# Patient Record
Sex: FEMALE | URBAN - METROPOLITAN AREA
[De-identification: names, ages, dates, MRNs, and addresses within clinical notes are randomized per-mention and may not be internally consistent; named-entity substitution may affect disease eponyms.]

---

## 2019-09-17 ENCOUNTER — OFFICE (OUTPATIENT)
Dept: URBAN - METROPOLITAN AREA CLINIC 6 | Facility: CLINIC | Age: 69
End: 2019-09-17

## 2019-09-17 VITALS
SYSTOLIC BLOOD PRESSURE: 153 MMHG | WEIGHT: 106 LBS | DIASTOLIC BLOOD PRESSURE: 82 MMHG | HEART RATE: 87 BPM | TEMPERATURE: 98.5 F | HEIGHT: 63 IN

## 2019-09-17 DIAGNOSIS — Z12.11 SCREENING FOR COLONIC NEOPLASIA: ICD-10-CM

## 2019-09-17 DIAGNOSIS — K59.01 CONSTIPATION, SLOW TRANSIT: ICD-10-CM

## 2019-09-17 DIAGNOSIS — Z80.0 FAMILY HISTORY OF MALIGNANT NEOPLASM OF COLON: ICD-10-CM

## 2019-09-17 PROCEDURE — 99245 OFF/OP CONSLTJ NEW/EST HI 55: CPT | Performed by: SPECIALIST

## 2019-09-17 NOTE — SERVICEHPINOTES
The patient is seen for evaluation of constipation.    Notes the onset of constipation   several  months   ago.  Symptoms started   gradually  .   Currently, the patient evacuates stool   3   times per   week  .    The stools are   hard  .   They are typically   brown   in color.    Associated symptoms include   mild lower abdominal discomfort and bloating  .  Symptoms are alleviated with   Fiber supplement  .  The patient has previously medicated symptoms with   fiber supplement  .   The patient has undergone a colonoscopy    ago.  Findings on that exam included   .    Patient reports family history of colon cancer. Her mother had colon cancer in her 60s. Her last colonoscopy was more than 20 years ago.

## 2019-10-03 ENCOUNTER — AMBULATORY SURGICAL CENTER (OUTPATIENT)
Dept: URBAN - METROPOLITAN AREA SURGERY 5 | Facility: SURGERY | Age: 69
End: 2019-10-03

## 2019-10-03 VITALS
RESPIRATION RATE: 15 BRPM | DIASTOLIC BLOOD PRESSURE: 69 MMHG | WEIGHT: 100 LBS | OXYGEN SATURATION: 100 % | TEMPERATURE: 98.1 F | HEART RATE: 81 BPM | HEIGHT: 63 IN | SYSTOLIC BLOOD PRESSURE: 144 MMHG

## 2019-10-03 DIAGNOSIS — D12.2 BENIGN NEOPLASM OF ASCENDING COLON: ICD-10-CM

## 2019-10-03 DIAGNOSIS — Z12.11 ENCOUNTER FOR SCREENING FOR MALIGNANT NEOPLASM OF COLON: ICD-10-CM

## 2019-10-03 PROBLEM — D12.3 BENIGN NEOPLASM OF TRANSVERSE COLON: Status: ACTIVE | Noted: 2019-10-03

## 2019-10-03 PROBLEM — K64.0 FIRST DEGREE HEMORRHOIDS: Status: ACTIVE | Noted: 2019-10-03

## 2019-10-03 PROCEDURE — 45385 COLONOSCOPY W/LESION REMOVAL: CPT | Performed by: SPECIALIST

## 2019-10-03 PROCEDURE — 45380 COLONOSCOPY AND BIOPSY: CPT | Mod: 59 | Performed by: SPECIALIST

## 2019-10-04 LAB — SURGICAL: PDF REPORT: (no result)

## 2023-09-02 ENCOUNTER — APPOINTMENT (OUTPATIENT)
Dept: CARDIOLOGY | Facility: MEDICAL CENTER | Age: 73
DRG: 246 | End: 2023-09-02
Attending: INTERNAL MEDICINE
Payer: MEDICARE

## 2023-09-02 ENCOUNTER — HOSPITAL ENCOUNTER (INPATIENT)
Facility: MEDICAL CENTER | Age: 73
LOS: 4 days | DRG: 246 | End: 2023-09-06
Attending: EMERGENCY MEDICINE | Admitting: INTERNAL MEDICINE
Payer: MEDICARE

## 2023-09-02 ENCOUNTER — APPOINTMENT (OUTPATIENT)
Dept: RADIOLOGY | Facility: MEDICAL CENTER | Age: 73
DRG: 246 | End: 2023-09-02
Payer: MEDICARE

## 2023-09-02 DIAGNOSIS — I21.02 ST ELEVATION MYOCARDIAL INFARCTION INVOLVING LEFT ANTERIOR DESCENDING (LAD) CORONARY ARTERY (HCC): ICD-10-CM

## 2023-09-02 DIAGNOSIS — I47.10 PSVT (PAROXYSMAL SUPRAVENTRICULAR TACHYCARDIA) (HCC): ICD-10-CM

## 2023-09-02 DIAGNOSIS — Z92.89 HISTORY OF THROMBOLYTIC THERAPY: ICD-10-CM

## 2023-09-02 DIAGNOSIS — I21.3 ST ELEVATION MYOCARDIAL INFARCTION (STEMI), UNSPECIFIED ARTERY (HCC): ICD-10-CM

## 2023-09-02 DIAGNOSIS — I25.5 ISCHEMIC CARDIOMYOPATHY: ICD-10-CM

## 2023-09-02 PROBLEM — E03.8 HYPOTHYROIDISM DUE TO HASHIMOTO'S THYROIDITIS: Status: ACTIVE | Noted: 2023-09-02

## 2023-09-02 PROBLEM — E78.5 HYPERLIPIDEMIA: Status: ACTIVE | Noted: 2023-09-02

## 2023-09-02 PROBLEM — E06.3 HYPOTHYROIDISM DUE TO HASHIMOTO'S THYROIDITIS: Status: ACTIVE | Noted: 2023-09-02

## 2023-09-02 LAB
ACT BLD: 317 SEC (ref 74–137)
ACT BLD: 329 SEC (ref 74–137)
ALBUMIN SERPL BCP-MCNC: 4.8 G/DL (ref 3.2–4.9)
ALBUMIN/GLOB SERPL: 1.7 G/DL
ALP SERPL-CCNC: 74 U/L (ref 30–99)
ALT SERPL-CCNC: 17 U/L (ref 2–50)
ANION GAP SERPL CALC-SCNC: 13 MMOL/L (ref 7–16)
APTT PPP: 84 SEC (ref 24.7–36)
AST SERPL-CCNC: 42 U/L (ref 12–45)
BASOPHILS # BLD AUTO: 0.2 % (ref 0–1.8)
BASOPHILS # BLD: 0.03 K/UL (ref 0–0.12)
BILIRUB SERPL-MCNC: 0.2 MG/DL (ref 0.1–1.5)
BUN SERPL-MCNC: 20 MG/DL (ref 8–22)
CALCIUM ALBUM COR SERPL-MCNC: 8.9 MG/DL (ref 8.5–10.5)
CALCIUM SERPL-MCNC: 9.5 MG/DL (ref 8.5–10.5)
CHLORIDE SERPL-SCNC: 102 MMOL/L (ref 96–112)
CO2 SERPL-SCNC: 25 MMOL/L (ref 20–33)
CREAT SERPL-MCNC: 0.8 MG/DL (ref 0.5–1.4)
EKG IMPRESSION: NORMAL
EOSINOPHIL # BLD AUTO: 0 K/UL (ref 0–0.51)
EOSINOPHIL NFR BLD: 0 % (ref 0–6.9)
ERYTHROCYTE [DISTWIDTH] IN BLOOD BY AUTOMATED COUNT: 42.8 FL (ref 35.9–50)
GFR SERPLBLD CREATININE-BSD FMLA CKD-EPI: 77 ML/MIN/1.73 M 2
GLOBULIN SER CALC-MCNC: 2.8 G/DL (ref 1.9–3.5)
GLUCOSE SERPL-MCNC: 189 MG/DL (ref 65–99)
HCT VFR BLD AUTO: 45.6 % (ref 37–47)
HGB BLD-MCNC: 15.4 G/DL (ref 12–16)
IMM GRANULOCYTES # BLD AUTO: 0.07 K/UL (ref 0–0.11)
IMM GRANULOCYTES NFR BLD AUTO: 0.4 % (ref 0–0.9)
INR PPP: 1.04 (ref 0.87–1.13)
LV EJECT FRACT  99904: 30
LV EJECT FRACT MOD 2C 99903: 43.37
LV EJECT FRACT MOD 4C 99902: 40.45
LV EJECT FRACT MOD BP 99901: 41.82
LYMPHOCYTES # BLD AUTO: 0.47 K/UL (ref 1–4.8)
LYMPHOCYTES NFR BLD: 2.6 % (ref 22–41)
MCH RBC QN AUTO: 31.1 PG (ref 27–33)
MCHC RBC AUTO-ENTMCNC: 33.8 G/DL (ref 32.2–35.5)
MCV RBC AUTO: 92.1 FL (ref 81.4–97.8)
MONOCYTES # BLD AUTO: 0.39 K/UL (ref 0–0.85)
MONOCYTES NFR BLD AUTO: 2.1 % (ref 0–13.4)
NEUTROPHILS # BLD AUTO: 17.25 K/UL (ref 1.82–7.42)
NEUTROPHILS NFR BLD: 94.7 % (ref 44–72)
NRBC # BLD AUTO: 0 K/UL
NRBC BLD-RTO: 0 /100 WBC (ref 0–0.2)
NT-PROBNP SERPL IA-MCNC: 2666 PG/ML (ref 0–125)
PLATELET # BLD AUTO: 312 K/UL (ref 164–446)
PMV BLD AUTO: 10.7 FL (ref 9–12.9)
POTASSIUM SERPL-SCNC: 4 MMOL/L (ref 3.6–5.5)
PROT SERPL-MCNC: 7.6 G/DL (ref 6–8.2)
PROTHROMBIN TIME: 13.8 SEC (ref 12–14.6)
RBC # BLD AUTO: 4.95 M/UL (ref 4.2–5.4)
SODIUM SERPL-SCNC: 140 MMOL/L (ref 135–145)
T4 FREE SERPL-MCNC: 1.49 NG/DL (ref 0.93–1.7)
TROPONIN T SERPL-MCNC: 1135 NG/L (ref 6–19)
TSH SERPL DL<=0.005 MIU/L-ACNC: 0.96 UIU/ML (ref 0.38–5.33)
TSH SERPL DL<=0.005 MIU/L-ACNC: 0.97 UIU/ML (ref 0.38–5.33)
UFH PPP CHRO-ACNC: 0.76 IU/ML
WBC # BLD AUTO: 18.2 K/UL (ref 4.8–10.8)

## 2023-09-02 PROCEDURE — 36415 COLL VENOUS BLD VENIPUNCTURE: CPT

## 2023-09-02 PROCEDURE — 700111 HCHG RX REV CODE 636 W/ 250 OVERRIDE (IP)

## 2023-09-02 PROCEDURE — 700102 HCHG RX REV CODE 250 W/ 637 OVERRIDE(OP): Performed by: INTERNAL MEDICINE

## 2023-09-02 PROCEDURE — 84443 ASSAY THYROID STIM HORMONE: CPT

## 2023-09-02 PROCEDURE — 770022 HCHG ROOM/CARE - ICU (200)

## 2023-09-02 PROCEDURE — 93010 ELECTROCARDIOGRAM REPORT: CPT | Mod: 59 | Performed by: INTERNAL MEDICINE

## 2023-09-02 PROCEDURE — 3E05317 INTRODUCTION OF OTHER THROMBOLYTIC INTO PERIPHERAL ARTERY, PERCUTANEOUS APPROACH: ICD-10-PCS | Performed by: EMERGENCY MEDICINE

## 2023-09-02 PROCEDURE — 71045 X-RAY EXAM CHEST 1 VIEW: CPT

## 2023-09-02 PROCEDURE — 99153 MOD SED SAME PHYS/QHP EA: CPT

## 2023-09-02 PROCEDURE — B221Z2Z COMPUTERIZED TOMOGRAPHY (CT SCAN) OF MULTIPLE CORONARY ARTERIES USING INTRAVASCULAR OPTICAL COHERENCE: ICD-10-PCS | Performed by: INTERNAL MEDICINE

## 2023-09-02 PROCEDURE — 99291 CRITICAL CARE FIRST HOUR: CPT

## 2023-09-02 PROCEDURE — 700102 HCHG RX REV CODE 250 W/ 637 OVERRIDE(OP): Performed by: EMERGENCY MEDICINE

## 2023-09-02 PROCEDURE — 85730 THROMBOPLASTIN TIME PARTIAL: CPT

## 2023-09-02 PROCEDURE — 85520 HEPARIN ASSAY: CPT

## 2023-09-02 PROCEDURE — 85025 COMPLETE CBC W/AUTO DIFF WBC: CPT

## 2023-09-02 PROCEDURE — 93005 ELECTROCARDIOGRAM TRACING: CPT | Performed by: INTERNAL MEDICINE

## 2023-09-02 PROCEDURE — 93306 TTE W/DOPPLER COMPLETE: CPT | Mod: 26 | Performed by: INTERNAL MEDICINE

## 2023-09-02 PROCEDURE — 92978 ENDOLUMINL IVUS OCT C 1ST: CPT | Mod: 26,LD | Performed by: INTERNAL MEDICINE

## 2023-09-02 PROCEDURE — B2111ZZ FLUOROSCOPY OF MULTIPLE CORONARY ARTERIES USING LOW OSMOLAR CONTRAST: ICD-10-PCS | Performed by: INTERNAL MEDICINE

## 2023-09-02 PROCEDURE — 93005 ELECTROCARDIOGRAM TRACING: CPT | Performed by: EMERGENCY MEDICINE

## 2023-09-02 PROCEDURE — 96365 THER/PROPH/DIAG IV INF INIT: CPT

## 2023-09-02 PROCEDURE — A9270 NON-COVERED ITEM OR SERVICE: HCPCS | Performed by: INTERNAL MEDICINE

## 2023-09-02 PROCEDURE — 96368 THER/DIAG CONCURRENT INF: CPT

## 2023-09-02 PROCEDURE — 84439 ASSAY OF FREE THYROXINE: CPT

## 2023-09-02 PROCEDURE — 85610 PROTHROMBIN TIME: CPT

## 2023-09-02 PROCEDURE — 85347 COAGULATION TIME ACTIVATED: CPT | Mod: 91

## 2023-09-02 PROCEDURE — 700101 HCHG RX REV CODE 250

## 2023-09-02 PROCEDURE — 80053 COMPREHEN METABOLIC PANEL: CPT

## 2023-09-02 PROCEDURE — 99291 CRITICAL CARE FIRST HOUR: CPT | Performed by: INTERNAL MEDICINE

## 2023-09-02 PROCEDURE — 99152 MOD SED SAME PHYS/QHP 5/>YRS: CPT | Performed by: INTERNAL MEDICINE

## 2023-09-02 PROCEDURE — 96375 TX/PRO/DX INJ NEW DRUG ADDON: CPT

## 2023-09-02 PROCEDURE — 700117 HCHG RX CONTRAST REV CODE 255: Performed by: INTERNAL MEDICINE

## 2023-09-02 PROCEDURE — 93458 L HRT ARTERY/VENTRICLE ANGIO: CPT | Mod: 26,59 | Performed by: INTERNAL MEDICINE

## 2023-09-02 PROCEDURE — 027035Z DILATION OF CORONARY ARTERY, ONE ARTERY WITH TWO DRUG-ELUTING INTRALUMINAL DEVICES, PERCUTANEOUS APPROACH: ICD-10-PCS | Performed by: INTERNAL MEDICINE

## 2023-09-02 PROCEDURE — 4A023N7 MEASUREMENT OF CARDIAC SAMPLING AND PRESSURE, LEFT HEART, PERCUTANEOUS APPROACH: ICD-10-PCS | Performed by: INTERNAL MEDICINE

## 2023-09-02 PROCEDURE — 700111 HCHG RX REV CODE 636 W/ 250 OVERRIDE (IP): Mod: JZ | Performed by: EMERGENCY MEDICINE

## 2023-09-02 PROCEDURE — 84484 ASSAY OF TROPONIN QUANT: CPT

## 2023-09-02 PROCEDURE — B2151ZZ FLUOROSCOPY OF LEFT HEART USING LOW OSMOLAR CONTRAST: ICD-10-PCS | Performed by: INTERNAL MEDICINE

## 2023-09-02 PROCEDURE — 99223 1ST HOSP IP/OBS HIGH 75: CPT | Performed by: INTERNAL MEDICINE

## 2023-09-02 PROCEDURE — 93306 TTE W/DOPPLER COMPLETE: CPT

## 2023-09-02 PROCEDURE — 92941 PRQ TRLML REVSC TOT OCCL AMI: CPT | Mod: LD | Performed by: INTERNAL MEDICINE

## 2023-09-02 PROCEDURE — 700105 HCHG RX REV CODE 258: Performed by: INTERNAL MEDICINE

## 2023-09-02 PROCEDURE — 83880 ASSAY OF NATRIURETIC PEPTIDE: CPT

## 2023-09-02 PROCEDURE — A9270 NON-COVERED ITEM OR SERVICE: HCPCS | Performed by: EMERGENCY MEDICINE

## 2023-09-02 PROCEDURE — 96367 TX/PROPH/DG ADDL SEQ IV INF: CPT

## 2023-09-02 RX ORDER — MIDAZOLAM HYDROCHLORIDE 1 MG/ML
INJECTION INTRAMUSCULAR; INTRAVENOUS
Status: COMPLETED
Start: 2023-09-02 | End: 2023-09-02

## 2023-09-02 RX ORDER — ASPIRIN 300 MG/1
300 SUPPOSITORY RECTAL DAILY
Status: DISCONTINUED | OUTPATIENT
Start: 2023-09-03 | End: 2023-09-02

## 2023-09-02 RX ORDER — CLOPIDOGREL 300 MG/1
300 TABLET, FILM COATED ORAL ONCE
Status: COMPLETED | OUTPATIENT
Start: 2023-09-02 | End: 2023-09-02

## 2023-09-02 RX ORDER — ATORVASTATIN CALCIUM 80 MG/1
80 TABLET, FILM COATED ORAL EVERY EVENING
Status: DISCONTINUED | OUTPATIENT
Start: 2023-09-02 | End: 2023-09-06 | Stop reason: HOSPADM

## 2023-09-02 RX ORDER — AMOXICILLIN 250 MG
2 CAPSULE ORAL 2 TIMES DAILY
Status: DISCONTINUED | OUTPATIENT
Start: 2023-09-02 | End: 2023-09-06 | Stop reason: HOSPADM

## 2023-09-02 RX ORDER — SODIUM CHLORIDE 9 MG/ML
3 INJECTION, SOLUTION INTRAVENOUS CONTINUOUS
Status: ACTIVE | OUTPATIENT
Start: 2023-09-02 | End: 2023-09-02

## 2023-09-02 RX ORDER — HEPARIN SODIUM 5000 [USP'U]/100ML
INJECTION, SOLUTION INTRAVENOUS
Status: COMPLETED
Start: 2023-09-02 | End: 2023-09-02

## 2023-09-02 RX ORDER — ASPIRIN 81 MG/1
324 TABLET, CHEWABLE ORAL DAILY
Status: DISCONTINUED | OUTPATIENT
Start: 2023-09-03 | End: 2023-09-02

## 2023-09-02 RX ORDER — LEVOTHYROXINE SODIUM 0.07 MG/1
75 TABLET ORAL
COMMUNITY

## 2023-09-02 RX ORDER — METOPROLOL SUCCINATE 50 MG/1
50 TABLET, EXTENDED RELEASE ORAL EVERY EVENING
Status: ON HOLD | COMMUNITY
End: 2023-09-05

## 2023-09-02 RX ORDER — LISINOPRIL 5 MG/1
5 TABLET ORAL TWICE DAILY
Status: DISCONTINUED | OUTPATIENT
Start: 2023-09-02 | End: 2023-09-03

## 2023-09-02 RX ORDER — CLOPIDOGREL 300 MG/1
TABLET, FILM COATED ORAL
Status: COMPLETED
Start: 2023-09-02 | End: 2023-09-02

## 2023-09-02 RX ORDER — HEPARIN SODIUM 1000 [USP'U]/ML
30 INJECTION, SOLUTION INTRAVENOUS; SUBCUTANEOUS PRN
Status: DISCONTINUED | OUTPATIENT
Start: 2023-09-02 | End: 2023-09-03

## 2023-09-02 RX ORDER — VERAPAMIL HYDROCHLORIDE 2.5 MG/ML
INJECTION, SOLUTION INTRAVENOUS
Status: COMPLETED
Start: 2023-09-02 | End: 2023-09-02

## 2023-09-02 RX ORDER — HEPARIN SODIUM 200 [USP'U]/100ML
INJECTION, SOLUTION INTRAVENOUS
Status: COMPLETED
Start: 2023-09-02 | End: 2023-09-02

## 2023-09-02 RX ORDER — ASPIRIN 81 MG/1
81 TABLET ORAL DAILY
Status: DISCONTINUED | OUTPATIENT
Start: 2023-09-02 | End: 2023-09-06 | Stop reason: HOSPADM

## 2023-09-02 RX ORDER — BISACODYL 10 MG
10 SUPPOSITORY, RECTAL RECTAL
Status: DISCONTINUED | OUTPATIENT
Start: 2023-09-02 | End: 2023-09-06 | Stop reason: HOSPADM

## 2023-09-02 RX ORDER — LIDOCAINE HYDROCHLORIDE 20 MG/ML
INJECTION, SOLUTION INFILTRATION; PERINEURAL
Status: COMPLETED
Start: 2023-09-02 | End: 2023-09-02

## 2023-09-02 RX ORDER — HEPARIN SODIUM 1000 [USP'U]/ML
30 INJECTION, SOLUTION INTRAVENOUS; SUBCUTANEOUS PRN
Status: DISCONTINUED | OUTPATIENT
Start: 2023-09-02 | End: 2023-09-02

## 2023-09-02 RX ORDER — ASPIRIN 325 MG
325 TABLET ORAL DAILY
Status: DISCONTINUED | OUTPATIENT
Start: 2023-09-03 | End: 2023-09-02

## 2023-09-02 RX ORDER — HEPARIN SODIUM 5000 [USP'U]/100ML
0-30 INJECTION, SOLUTION INTRAVENOUS CONTINUOUS
Status: DISCONTINUED | OUTPATIENT
Start: 2023-09-02 | End: 2023-09-02

## 2023-09-02 RX ORDER — POLYETHYLENE GLYCOL 3350 17 G/17G
1 POWDER, FOR SOLUTION ORAL
Status: DISCONTINUED | OUTPATIENT
Start: 2023-09-02 | End: 2023-09-06 | Stop reason: HOSPADM

## 2023-09-02 RX ORDER — LISINOPRIL 5 MG/1
5 TABLET ORAL
Status: DISCONTINUED | OUTPATIENT
Start: 2023-09-03 | End: 2023-09-02

## 2023-09-02 RX ORDER — HEPARIN SODIUM 1000 [USP'U]/ML
INJECTION, SOLUTION INTRAVENOUS; SUBCUTANEOUS
Status: COMPLETED
Start: 2023-09-02 | End: 2023-09-02

## 2023-09-02 RX ORDER — NITROGLYCERIN 20 MG/100ML
0-200 INJECTION INTRAVENOUS CONTINUOUS
Status: DISCONTINUED | OUTPATIENT
Start: 2023-09-02 | End: 2023-09-02

## 2023-09-02 RX ORDER — ALENDRONATE SODIUM 70 MG/1
70 TABLET ORAL
COMMUNITY

## 2023-09-02 RX ORDER — CLOPIDOGREL 300 MG/1
300 TABLET, FILM COATED ORAL ONCE
Status: DISCONTINUED | OUTPATIENT
Start: 2023-09-02 | End: 2023-09-02

## 2023-09-02 RX ORDER — SPIRONOLACTONE 25 MG/1
12.5 TABLET ORAL
Status: DISCONTINUED | OUTPATIENT
Start: 2023-09-03 | End: 2023-09-03

## 2023-09-02 RX ORDER — NITROGLYCERIN 20 MG/100ML
0-200 INJECTION INTRAVENOUS CONTINUOUS
Status: DISCONTINUED | OUTPATIENT
Start: 2023-09-02 | End: 2023-09-03

## 2023-09-02 RX ORDER — MORPHINE SULFATE 4 MG/ML
2-4 INJECTION INTRAVENOUS
Status: DISCONTINUED | OUTPATIENT
Start: 2023-09-02 | End: 2023-09-06 | Stop reason: HOSPADM

## 2023-09-02 RX ORDER — CLOPIDOGREL BISULFATE 75 MG/1
75 TABLET ORAL DAILY
Status: DISCONTINUED | OUTPATIENT
Start: 2023-09-03 | End: 2023-09-06 | Stop reason: HOSPADM

## 2023-09-02 RX ORDER — HEPARIN SODIUM 5000 [USP'U]/100ML
0-30 INJECTION, SOLUTION INTRAVENOUS CONTINUOUS
Status: DISCONTINUED | OUTPATIENT
Start: 2023-09-02 | End: 2023-09-03

## 2023-09-02 RX ADMIN — SENNOSIDES AND DOCUSATE SODIUM 2 TABLET: 50; 8.6 TABLET ORAL at 17:08

## 2023-09-02 RX ADMIN — FENTANYL CITRATE 50 MCG: 50 INJECTION, SOLUTION INTRAMUSCULAR; INTRAVENOUS at 09:25

## 2023-09-02 RX ADMIN — SODIUM CHLORIDE 3 ML/KG/HR: 9 INJECTION, SOLUTION INTRAVENOUS at 10:16

## 2023-09-02 RX ADMIN — HEPARIN SODIUM 12 UNITS/KG/HR: 5000 INJECTION, SOLUTION INTRAVENOUS at 06:31

## 2023-09-02 RX ADMIN — ATORVASTATIN CALCIUM 80 MG: 80 TABLET, FILM COATED ORAL at 17:08

## 2023-09-02 RX ADMIN — METOPROLOL TARTRATE 25 MG: 25 TABLET, FILM COATED ORAL at 21:03

## 2023-09-02 RX ADMIN — HEPARIN SODIUM: 1000 INJECTION, SOLUTION INTRAVENOUS; SUBCUTANEOUS at 08:35

## 2023-09-02 RX ADMIN — LIDOCAINE HYDROCHLORIDE: 20 INJECTION, SOLUTION INFILTRATION; PERINEURAL at 08:35

## 2023-09-02 RX ADMIN — MIDAZOLAM 2 MG: 1 INJECTION, SOLUTION INTRAMUSCULAR; INTRAVENOUS at 08:45

## 2023-09-02 RX ADMIN — VERAPAMIL HYDROCHLORIDE 2.5 MG: 2.5 INJECTION, SOLUTION INTRAVENOUS at 08:35

## 2023-09-02 RX ADMIN — NITROGLYCERIN 10 ML: 20 INJECTION INTRAVENOUS at 08:36

## 2023-09-02 RX ADMIN — FENTANYL CITRATE 100 MCG: 50 INJECTION, SOLUTION INTRAMUSCULAR; INTRAVENOUS at 08:45

## 2023-09-02 RX ADMIN — HEPARIN SODIUM 2000 UNITS: 200 INJECTION, SOLUTION INTRAVENOUS at 08:35

## 2023-09-02 RX ADMIN — NITROGLYCERIN 20 MCG/MIN: 20 INJECTION INTRAVENOUS at 06:44

## 2023-09-02 RX ADMIN — MIDAZOLAM 1.5 MG: 1 INJECTION, SOLUTION INTRAMUSCULAR; INTRAVENOUS at 09:25

## 2023-09-02 RX ADMIN — CLOPIDOGREL BISULFATE 300 MG: 300 TABLET, FILM COATED ORAL at 06:39

## 2023-09-02 RX ADMIN — LISINOPRIL 5 MG: 5 TABLET ORAL at 14:08

## 2023-09-02 RX ADMIN — METOPROLOL TARTRATE 25 MG: 25 TABLET, FILM COATED ORAL at 14:08

## 2023-09-02 RX ADMIN — HUMAN ALBUMIN MICROSPHERES AND PERFLUTREN 3 ML: 10; .22 INJECTION, SOLUTION INTRAVENOUS at 08:24

## 2023-09-02 ASSESSMENT — ENCOUNTER SYMPTOMS
VOMITING: 0
EYE REDNESS: 0
BRUISES/BLEEDS EASILY: 0
COUGH: 0
WEIGHT LOSS: 0
FEVER: 0
WHEEZING: 0
NAUSEA: 0
HEADACHES: 0
MYALGIAS: 0
MEMORY LOSS: 0
EYE DISCHARGE: 0
PALPITATIONS: 1
CHILLS: 0
DIZZINESS: 0
SHORTNESS OF BREATH: 0
SORE THROAT: 0

## 2023-09-02 ASSESSMENT — PATIENT HEALTH QUESTIONNAIRE - PHQ9
1. LITTLE INTEREST OR PLEASURE IN DOING THINGS: NOT AT ALL
2. FEELING DOWN, DEPRESSED, IRRITABLE, OR HOPELESS: NOT AT ALL
SUM OF ALL RESPONSES TO PHQ9 QUESTIONS 1 AND 2: 0

## 2023-09-02 ASSESSMENT — PAIN DESCRIPTION - PAIN TYPE: TYPE: ACUTE PAIN

## 2023-09-02 NOTE — CONSULTS
Reason for Consult:  Asked by Dr Shan Martinez M.D. to see this patient with stemi    CC: chest pressure, n/v    HPI:      72 year old woman with PMH HTN, hypothyroidism presents with stemi. Initially presented to outside hospital following symptoms of chest tightness/pressure associated with nausea and vomiting. Initially 4/10 she recalls. No other cardiac complaints. She initially suspected indigestion. Was treated with thrombolysis 0400 for her stemi along with aspirin, heparin gtt. She initially vomitted the aspirin and was redosed. Denies toxic social habits. No relevant family history. At [facility] improved symptoms to 2/10 chest tightness. Nausea much improved. Repeat ekg with improved st elevations.     Medications / Drug list prior to admission:  No current facility-administered medications on file prior to encounter.     No current outpatient medications on file prior to encounter.       Current list of administered Medications:    Current Facility-Administered Medications:     HEPARIN (PORCINE) IN NACL 36137-4.45 UT/500ML-% IV SOLN, , , ,     clopidogrel (Plavix) tablet 300 mg, 300 mg, Oral, Once, Shan Martinez M.D.    nitroglycerin 50 mg in D5W 250 ml infusion, 0-200 mcg/min, Intravenous, Continuous, Shan Martinez M.D.    heparin infusion 25,000 units in 500 mL 0.45% NACL, 0-30 Units/kg/hr, Intravenous, Continuous, Shan Martinez M.D.    heparin injection 1,600 Units, 30 Units/kg, Intravenous, PRN, Shan Martinez M.D.  No current outpatient medications on file.    No past medical history on file.    No past surgical history on file.    No family history on file.  Patient family history was personally reviewed, no pertinent family history to current presentation    Social History     Socioeconomic History    Marital status: Not on file     Spouse name: Not on file    Number of children: Not on file    Years of education: Not on file    Highest education level: Not on file   Occupational History    Not on file  "  Tobacco Use    Smoking status: Not on file    Smokeless tobacco: Not on file   Substance and Sexual Activity    Alcohol use: Not on file    Drug use: Not on file    Sexual activity: Not on file   Other Topics Concern    Not on file   Social History Narrative    Not on file     Social Determinants of Health     Financial Resource Strain: Not on file   Food Insecurity: Not on file   Transportation Needs: Not on file   Physical Activity: Not on file   Stress: Not on file   Social Connections: Not on file   Intimate Partner Violence: Not on file   Housing Stability: Not on file       ALLERGIES:  Not on File    Review of systems:  A complete review of symptoms was reviewed with patient. This is reviewed in H&P and PMH. ALL OTHERS reviewed and negative    Physical exam:  Patient Vitals for the past 24 hrs:   BP Pulse Resp SpO2 Height Weight   23 0615 (!) 142/81 (!) 108 18 96 % -- --   23 0608 -- -- -- -- 1.6 m (5' 2.99\") 52 kg (114 lb 10.2 oz)     General: No acute distress.   EYES: no jaundice  HEENT: OP clear   Neck: No bruits No JVD.   CVS:  RRR. S1 + S2. No M/R/G. No edema.  Resp: CTAB. No wheezing or crackles/rhonchi.  Abdomen: Soft, NT, ND,  Skin: Grossly nothing acute no obvious rashes  Neurological: Alert, Moves all extremities, no cranial nerve defects on limited exam  Extremities: Pulse 2+ in b/l LE. No cyanosis.     Data:  Laboratory studies personally reviewed by me:  Recent Results (from the past 24 hour(s))   EKG - STAT    Collection Time: 23  6:01 AM   Result Value Ref Range    Report       [facility] Emergency Dept.    Test Date:  2023  Pt Name:    KIRK AN                 Department: ER  MRN:        [id number]                      Room:       TRAUMA - EXAM 1  Gender:     Female                       Technician: [id number]  :        1950                   Requested By:CRYSTAL HUGHES  Order #:    [id number]                    Reading " MD:    Measurements  Intervals                                Axis  Rate:       111                          P:          90  FL:         133                          QRS:        16  QRSD:       76                           T:          72  QT:         337  QTc:        458    Interpretive Statements  Sinus tachycardia  Atrial premature complexes  Anterolateral infarct, acute (LAD)  No previous ECG available for comparison         EKG  9/2/23 interpreted by me sinus tach 111, anterolateral infarct recent, inferior also involved    All pertinent features of laboratory and imaging reviewed including primary images where applicable      Active Problems:    * No active hospital problems. *  Resolved Problems:    * No resolved hospital problems. *      Assessment / Plan:  72 year old woman with PMH HTN, hypothyroidism presents with stemi s/p thrombolysis.    -continue aspirin and heparin gtt  -load plavix 300mg  -high intensity statin  -nitro gtt  -LHC >2h post lysis. Will plan for this morning  -TTE    I personally discussed her case with Dr Martinez    It is my pleasure to participate in the care of Ms. Duarte.  Please do not hesitate to contact me with questions or concerns.    Candelario Shipman MD  Cardiologist [facility]    A total of 65 minutes of time was spent on day of encounter reviewing medical record, performing history and examination, counseling, ordering medication/test/consults and documentation.

## 2023-09-02 NOTE — ED NOTES
Bedside report from JIN Streeter. Pt resting in gurney, NAD. Call light within reach. Labs sent.   Detail Level: Detailed Add 98281 Cpt? (Important Note: In 2017 The Use Of 11180 Is Being Tracked By Cms To Determine Future Global Period Reimbursement For Global Periods): yes

## 2023-09-02 NOTE — DISCHARGE PLANNING
STEMI Response    Referral: Code STEMI Response    Intervention: MSW responded to STEMI.  Pt was BIB [facility] after STEMI.  Pt was alert upon arrival.  Pts name is Kristie Duarte (: 1950).  SW obtained the following pt information: SW spoke to the pt and she has her phone and advised SW that she will call her . The pt stated her  will be on his way to [facility] this morning.     Dennis Duarte () [phone]    Plan: SW will remain available for pt support.

## 2023-09-02 NOTE — ED PROVIDER NOTES
ER Provider Note    Scribed for Shan Martinez M.d. by Daniel Winters. 9/2/2023  6:12 AM    Primary Care Provider: No primary care provider on file.    CHIEF COMPLAINT  No chief complaint on file.      HPI/ROS  LIMITATION TO HISTORY   Select: : None  OUTSIDE HISTORIAN(S):  EMS Present at bedside    Kristie Duarte is a 72 y.o. female who presents to the ED for evaluation of a STEMI onset 23:00 9/1/2023. EMS reports the the patient complained of chest pressure and pain, describing it as 3/10 in intensity. EMS states the patient was seen in the [facility] where she had an EKG performed where ST segment elevation was observed and diagnosed with an inferior myocardial infarction . EMS states her troponin was elevated at that time. The patient was given TNK 30 mg at 0405, Heparin bolus 4000 unit, Heparin drip 12 unit/kg/hour, . Nitroglycerin 0.4, Tylenol 1g, and Zofran 4 mg injection. EMS states after TNK and nitroglycerin were administered, the patient was improved after medication. The patient states she has no history of MI, but does have a history of hypothyroidism Hashimoto's Disease, hypertension, and cholecystectomy. She denies any shortness of breath. chest pressure before symptom onset last night, but reports noticing tachycardia and believes altitude contributed.       PAST MEDICAL HISTORY  No past medical history on file.    SURGICAL HISTORY  No past surgical history on file.    FAMILY HISTORY  No family history on file.    SOCIAL HISTORY       CURRENT MEDICATIONS  Previous Medications    No medications on file       ALLERGIES  Patient has no allergy information on record.    PHYSICAL EXAM  Wt 52 kg (114 lb 10.2 oz)     Constitutional: Well developed, Well nourished, No acute distress, Non-toxic appearance.   HENT: Normocephalic, Atraumatic, Bilateral external ears normal, Oropharynx is clear mucous membranes are moist. No oral exudates or nasal discharge.   Eyes: Pupils are equal round and reactive, EOMI,  Conjunctiva normal, No discharge.   Neck: Normal range of motion, No tenderness, Supple, No stridor. No meningismus.  Lymphatic: No lymphadenopathy noted.   Cardiovascular: Tachycardia without murmur rub or gallop.  Thorax & Lungs: Clear breath sounds bilaterally without wheezes, rhonchi or rales. There is no chest wall tenderness.   Abdomen: Soft non-tender non-distended. There is no rebound or guarding. No organomegaly is appreciated. Bowel sounds are normal.  Skin: Normal without rash.   Back: No CVA or spinal tenderness.   Extremities: Intact distal pulses, No edema, No tenderness, No cyanosis, No clubbing. Capillary refill is less than 2 seconds.  Musculoskeletal: Good range of motion in all major joints. No tenderness to palpation or major deformities noted.   Neurologic: Alert & oriented x 3, Normal motor function, Normal sensory function, No focal deficits noted. Reflexes are normal.  Psychiatric: Affect normal, Judgment normal, Mood normal. There is no suicidal ideation or patient reported hallucinations.      DIAGNOSTIC STUDIES    Labs:   Labs Reviewed   CBC WITH DIFFERENTIAL - Abnormal; Notable for the following components:       Result Value    WBC 18.2 (*)     Neutrophils-Polys 94.70 (*)     Lymphocytes 2.60 (*)     Neutrophils (Absolute) 17.25 (*)     Lymphs (Absolute) 0.47 (*)     All other components within normal limits    Narrative:     Biotin intake of greater than 5 mg per day may interfere with  troponin levels, causing false low values.  Indicate which anticoagulants the patient is on:->UNKNOWN   COMP METABOLIC PANEL    Narrative:     Biotin intake of greater than 5 mg per day may interfere with  troponin levels, causing false low values.  Indicate which anticoagulants the patient is on:->UNKNOWN   TROPONIN    Narrative:     Biotin intake of greater than 5 mg per day may interfere with  troponin levels, causing false low values.  Indicate which anticoagulants the patient is on:->UNKNOWN   APTT     Narrative:     Biotin intake of greater than 5 mg per day may interfere with  troponin levels, causing false low values.  Indicate which anticoagulants the patient is on:->UNKNOWN   PROTHROMBIN TIME    Narrative:     Biotin intake of greater than 5 mg per day may interfere with  troponin levels, causing false low values.  Indicate which anticoagulants the patient is on:->UNKNOWN   TSH    Narrative:     Biotin intake of greater than 5 mg per day may interfere with  troponin levels, causing false low values.  Indicate which anticoagulants the patient is on:->UNKNOWN   FREE THYROXINE    Narrative:     Biotin intake of greater than 5 mg per day may interfere with  troponin levels, causing false low values.  Indicate which anticoagulants the patient is on:->UNKNOWN   HEPARIN XA (UNFRACTIONATED)    Narrative:     Biotin intake of greater than 5 mg per day may interfere with  troponin levels, causing false low values.  Indicate which anticoagulants the patient is on:->UNKNOWN        EKG:   I have independently interpreted this EKG  Results for orders placed or performed during the hospital encounter of 23   EKG - STAT   Result Value Ref Range    Report       [facility] Emergency Dept.    Test Date:  2023  Pt Name:    KIRK AN                 Department: ER  MRN:        [id number]                      Room:        10  Gender:     Female                       Technician: [id number]  :        1950                   Requested By:CRYSTAL HUGHES  Order #:    [id number]                    Reading MD: CRYSTAL HUGHES MD    Measurements  Intervals                                Axis  Rate:       111                          P:          90  MS:         133                          QRS:        16  QRSD:       76                           T:          72  QT:         337  QTc:        458    Interpretive Statements  Sinus tachycardia  Atrial premature complexes  Anterolateral infarct, acute (LAD)  No  previous ECG available for comparison  Electronically Signed On 09- 06:48:42 PDT by CRYSTAL HUGHES MD           Radiology:   The attending emergency physician has independently interpreted the diagnostic imaging associated with this visit and am waiting the final reading from the radiologist.   Preliminary interpretation is a follows: No airspace disease    Radiologist interpretation:   DX-CHEST-PORTABLE (1 VIEW)   Final Result         1.  No acute cardiopulmonary disease.      CL-LEFT HEART CATHETERIZATION WITH POSSIBLE INTERVENTION    (Results Pending)   EC-ECHOCARDIOGRAM COMPLETE W/O CONT    (Results Pending)   EC-ECHOCARDIOGRAM COMPLETE W/O CONT    (Results Pending)        COURSE & MEDICAL DECISION MAKING     ED Observation Status? No; Patient does not meet criteria for ED Observation.     INITIAL ASSESSMENT, COURSE AND PLAN  Care Narrative:     6:13 AM - Patient seen and examined at bedside. Discussed plan of care, including labs, imaging, and EKG. Patient agrees to the plan of care. The patient will be  medicated with heparin injection 1600 units and Plavix 300 mg. Ordered for labs, imaging, and EKG to evaluate her symptoms.   Discussed use of beta blockers with the patient and cardiology who recommended holding taking them.    Chest x-ray is unremarkable.  Laboratory evaluation shows white blood cell count over 18,000 with 94% PMN shift.  Repeat troponin is pending.  Her troponin at the sending facility was over 2000.  She continues to have some pain at a level of 2 out of 10    6:25 AM  AM - I discussed the patient's case and the above findings with Dr. Henning (Intensivist) who agrees to evaluate the patient for hospitalization.  Plan is for the Cath Lab prior to her going to the ICU    6:42 AM I discussed the patient's case and the above findings with Dr. Henning (Intensivist)     6:43 AM Patient was reevaluated at bedside. The patient's color appears improved and the patient reports feeling improved  after medication. Discussed lab and radiology results with the patient and informed them of my plan for hospitalization.  Heparin drip was continued.  Plavix load is done.  Her pain is improving    ADDITIONAL PROBLEM LIST  Hypothyroidism.  Check free T4 and TSH and adjust accordingly    Hypertension.  Manage in ICU    DISPOSITION AND DISCUSSIONS  I have discussed management of the patient with the following physicians and LUIS FELIPE's:  Dr. Henning (Intensivist)     Discussion of management with other [facility] or appropriate source(s): None     DISPOSITION:  Patient will be hospitalized by Dr. Henning in critical condition.     CRITICAL CARE  The very real possibilty of a deterioration of this patient's condition required the highest level of my preparedness for sudden, emergent intervention.  I provided critical care services, which included medication orders, frequent reevaluations of the patient's condition and response to treatment, ordering and reviewing test results, and discussing the case with various consultants.  The critical care time associated with the care of the patient was 30 minutes. Review chart for interventions. This time is exclusive of any other billable procedures.        FINAL DIANGOSIS  1. ST elevation myocardial infarction (STEMI), unspecified artery (HCC)    2. History of thrombolytic therapy    3.  Critical care time, 30 minutes    The note accurately reflects work and decisions made by me.  Shan Martinez M.D.  9/2/2023  6:51 AM

## 2023-09-02 NOTE — PROGRESS NOTES
"Patient arrived from cath lab, VSS, AOx4, mild intermittent chest discomfort with \"deep breaths\", right radial cath site with TR band clean/dry/soft, pulses intact, post-op VS initiated, EKG pending. Patient received TNK at 0405, per protocol, neuro assessments Q30 minutes until 1200 and then hourly. Patient's family at bedside, updates provided.  "

## 2023-09-02 NOTE — CONSULTS
Pulmonary/Critical Care Consultation    Date of consult: 9/2/2023    Referring Physician  Shan Martinez M.D.    Reason for Consultation  STEMI    History of Presenting Illness  72 y.o. female who presented 9/2/2023 with a history of chest pain and palpitations.  The patient is a 72-year-old female who presented to [facility] ED for evaluation of STEMI around 11 PM yesterday.  The patient has been complaining of tachycardia that started on Thursday with palpitations, she also reports that yesterday started feeling chest pressure that evolved to chest pain, the highest around 3 out of 10 in intensity for what she was seen at the emergency department.  Diagnostic work-up included an EKG that show ST segment elevation in the inferior leads.  EMS reports that the troponins were elevated for what the patient received TNK 30 mg at 4 0 5 AM today, was treated with heparin bolus and a subsequent heparin drip, aspirin 324 mg, nitro, Tylenol and Zofran.  The patient was transferred to our facility for further care the management for was called.  I was called from the emergency department to admit this patient who apparently was told the patient is not going immediately to the Cath Lab and will come to the ICU first.    Code Status  Full Code    Review of Systems  Review of Systems   Constitutional:  Negative for chills, fever and weight loss.   HENT:  Negative for congestion, nosebleeds and sore throat.    Eyes:  Negative for discharge and redness.   Respiratory:  Negative for cough, shortness of breath and wheezing.    Cardiovascular:  Positive for chest pain and palpitations. Negative for leg swelling.   Gastrointestinal:  Negative for nausea and vomiting.   Genitourinary:  Negative for dysuria.   Musculoskeletal:  Negative for myalgias.   Skin:  Negative for rash.   Neurological:  Negative for dizziness and headaches.   Endo/Heme/Allergies:  Does not bruise/bleed easily.   Psychiatric/Behavioral:  Negative for  memory loss.    All other systems reviewed and are negative.      Past Medical History   has no past medical history on file.    Surgical History   has no past surgical history on file.    Family History  family history is not on file.    Social History       Medications  Home Medications    **Home medications have not yet been reviewed for this encounter**       Current Facility-Administered Medications   Medication Dose Route Frequency Provider Last Rate Last Admin    senna-docusate (Pericolace Or Senokot S) 8.6-50 MG per tablet 2 Tablet  2 Tablet Oral BID Alexander Henning M.D.        And    polyethylene glycol/lytes (Miralax) PACKET 1 Packet  1 Packet Oral QDAY PRN Alexander Henning M.D.        And    magnesium hydroxide (Milk Of Magnesia) suspension 30 mL  30 mL Oral QDAY PRN Alexander Henning M.D.        And    bisacodyl (Dulcolax) suppository 10 mg  10 mg Rectal QDAY PRN Alexander Henning M.D.        heparin infusion 25,000 units in 500 mL 0.45% NACL  0-30 Units/kg/hr Intravenous Continuous Alexander Henning M.D.   Stopped. (Insulin or Heparin) at 09/02/23 0810    heparin injection 1,600 Units  30 Units/kg Intravenous PRN Alexander Henning M.D.        atorvastatin (Lipitor) tablet 80 mg  80 mg Oral Q EVENING Alexander Henning M.D.        [START ON 9/3/2023] spironolactone (Aldactone) tablet 12.5 mg  12.5 mg Oral Q DAY Alexander Henning M.D.        morphine 4 MG/ML injection 2-4 mg  2-4 mg Intravenous Q5 MIN PRN Alexander Henning M.D.        nitroglycerin 50 mg in D5W 250 ml infusion  0-200 mcg/min Intravenous Continuous Alexander Henning M.D.   Stopped at 09/02/23 0810    aspirin EC tablet 81 mg  81 mg Oral DAILY Petr Virgen M.D.        NS infusion  3 mL/kg/hr Intravenous Continuous Petr Virgen M.D. 156 mL/hr at 09/02/23 1016 3 mL/kg/hr at 09/02/23 1016    [START ON 9/3/2023] clopidogrel (Plavix) tablet 75 mg  75 mg Oral DAILY Petr Virgen M.D.        lisinopril (Prinivil)  tablet 5 mg  5 mg Oral TWICE DAILY Petr Virgen M.D.        metoprolol tartrate (Lopressor) tablet 25 mg  25 mg Oral Q8HRS Petr Virgen M.D.           Allergies  No Known Allergies    Vital Signs last 24 hours  Temp:  [36.2 °C (97.1 °F)] 36.2 °C (97.1 °F)  Pulse:  [] 93  Resp:  [18-25] 22  BP: (113-156)/(56-81) 126/56  SpO2:  [92 %-96 %] 94 %    Physical Exam  Physical Exam  Vitals and nursing note reviewed.   Constitutional:       General: She is not in acute distress.     Appearance: Normal appearance. She is not toxic-appearing.   HENT:      Head: Normocephalic and atraumatic.      Nose: Nose normal.      Mouth/Throat:      Mouth: Mucous membranes are moist.   Eyes:      Extraocular Movements: Extraocular movements intact.   Cardiovascular:      Rate and Rhythm: Normal rate and regular rhythm.      Pulses: Normal pulses.      Heart sounds: Normal heart sounds. No murmur heard.     No gallop.   Pulmonary:      Effort: Pulmonary effort is normal. No respiratory distress.      Breath sounds: Normal breath sounds. No stridor. No wheezing or rales.   Abdominal:      General: Bowel sounds are normal.   Musculoskeletal:         General: No swelling. Normal range of motion.      Cervical back: Normal range of motion. No rigidity.      Right lower leg: No edema.      Left lower leg: No edema.   Skin:     General: Skin is warm.      Capillary Refill: Capillary refill takes less than 2 seconds.      Findings: No rash.   Neurological:      General: No focal deficit present.      Mental Status: She is alert and oriented to person, place, and time.   Psychiatric:         Mood and Affect: Mood normal.         Fluids    Intake/Output Summary (Last 24 hours) at 9/2/2023 1305  Last data filed at 9/2/2023 1200  Gross per 24 hour   Intake 267.26 ml   Output --   Net 267.26 ml       Laboratory  Recent Results (from the past 48 hour(s))   EKG - STAT    Collection Time: 09/02/23  6:01 AM   Result Value Ref Range    Report        [facility] Emergency Dept.    Test Date:  2023  Pt Name:    KIRK AN                 Department: ER  MRN:        [id number]                      Room:       RD 10  Gender:     Female                       Technician: [id number]  :        1950                   Requested By:CRYSTAL HUGHES  Order #:    [id number]                    Reading MD: CRYSTAL HUGHES MD    Measurements  Intervals                                Axis  Rate:       111                          P:          90  ME:         133                          QRS:        16  QRSD:       76                           T:          72  QT:         337  QTc:        458    Interpretive Statements  Sinus tachycardia  Atrial premature complexes  Anterolateral infarct, acute (LAD)  No previous ECG available for comparison  Electronically Signed On 2023 06:48:42 PDT by CRYSTAL HUGHES MD     CBC w/ Differential    Collection Time: 23  6:07 AM   Result Value Ref Range    WBC 18.2 (H) 4.8 - 10.8 K/uL    RBC 4.95 4.20 - 5.40 M/uL    Hemoglobin 15.4 12.0 - 16.0 g/dL    Hematocrit 45.6 37.0 - 47.0 %    MCV 92.1 81.4 - 97.8 fL    MCH 31.1 27.0 - 33.0 pg    MCHC 33.8 32.2 - 35.5 g/dL    RDW 42.8 35.9 - 50.0 fL    Platelet Count 312 164 - 446 K/uL    MPV 10.7 9.0 - 12.9 fL    Neutrophils-Polys 94.70 (H) 44.00 - 72.00 %    Lymphocytes 2.60 (L) 22.00 - 41.00 %    Monocytes 2.10 0.00 - 13.40 %    Eosinophils 0.00 0.00 - 6.90 %    Basophils 0.20 0.00 - 1.80 %    Immature Granulocytes 0.40 0.00 - 0.90 %    Nucleated RBC 0.00 0.00 - 0.20 /100 WBC    Neutrophils (Absolute) 17.25 (H) 1.82 - 7.42 K/uL    Lymphs (Absolute) 0.47 (L) 1.00 - 4.80 K/uL    Monos (Absolute) 0.39 0.00 - 0.85 K/uL    Eos (Absolute) 0.00 0.00 - 0.51 K/uL    Baso (Absolute) 0.03 0.00 - 0.12 K/uL    Immature Granulocytes (abs) 0.07 0.00 - 0.11 K/uL    NRBC (Absolute) 0.00 K/uL   Complete Metabolic Panel (CMP)    Collection Time: 23  6:07 AM   Result Value Ref Range     Sodium 140 135 - 145 mmol/L    Potassium 4.0 3.6 - 5.5 mmol/L    Chloride 102 96 - 112 mmol/L    Co2 25 20 - 33 mmol/L    Anion Gap 13.0 7.0 - 16.0    Glucose 189 (H) 65 - 99 mg/dL    Bun 20 8 - 22 mg/dL    Creatinine 0.80 0.50 - 1.40 mg/dL    Calcium 9.5 8.5 - 10.5 mg/dL    Correct Calcium 8.9 8.5 - 10.5 mg/dL    AST(SGOT) 42 12 - 45 U/L    ALT(SGPT) 17 2 - 50 U/L    Alkaline Phosphatase 74 30 - 99 U/L    Total Bilirubin 0.2 0.1 - 1.5 mg/dL    Albumin 4.8 3.2 - 4.9 g/dL    Total Protein 7.6 6.0 - 8.2 g/dL    Globulin 2.8 1.9 - 3.5 g/dL    A-G Ratio 1.7 g/dL   Troponin - STAT Once    Collection Time: 09/02/23  6:07 AM   Result Value Ref Range    Troponin T 1135 (H) 6 - 19 ng/L   APTT    Collection Time: 09/02/23  6:07 AM   Result Value Ref Range    APTT 84.0 (H) 24.7 - 36.0 sec   PT/INR    Collection Time: 09/02/23  6:07 AM   Result Value Ref Range    PT 13.8 12.0 - 14.6 sec    INR 1.04 0.87 - 1.13   TSH (for screening thyroid dysfunction)    Collection Time: 09/02/23  6:07 AM   Result Value Ref Range    TSH 0.970 0.380 - 5.330 uIU/mL   Free Thyroxine (add to TSH for patients with existing thyroid dysfunction)    Collection Time: 09/02/23  6:07 AM   Result Value Ref Range    Free T-4 1.49 0.93 - 1.70 ng/dL   Heparin Anti-Xa    Collection Time: 09/02/23  6:07 AM   Result Value Ref Range    Heparin Xa (UFH) 0.76 IU/mL   proBrain Natriuretic Peptide, NT    Collection Time: 09/02/23  6:07 AM   Result Value Ref Range    NT-proBNP 2666 (H) 0 - 125 pg/mL   ESTIMATED GFR    Collection Time: 09/02/23  6:07 AM   Result Value Ref Range    GFR (CKD-EPI) 77 >60 mL/min/1.73 m 2   EC-ECHOCARDIOGRAM COMPLETE W/ CONT    Collection Time: 09/02/23  8:00 AM   Result Value Ref Range    Eject.Frac. MOD BP 41.82     Eject.Frac. MOD 4C 40.45     Eject.Frac. MOD 2C 43.37     Left Ventrical Ejection Fraction 30    POCT activated clotting time device results    Collection Time: 09/02/23  9:06 AM   Result Value Ref Range    Istat Activated  Clotting Time 317 (H) 74 - 137 sec   POCT activated clotting time device results    Collection Time: 23  9:26 AM   Result Value Ref Range    Istat Activated Clotting Time 329 (H) 74 - 137 sec   EKG - STAT Once    Collection Time: 23 10:27 AM   Result Value Ref Range    Report       [facility]    Test Date:  2023  Pt Name:    KIRK AN                 Department: ER  MRN:        [id number]                      Room:       13  Gender:     Female                       Technician: ADA  :        1950                   Requested By:JUSTUS OLIVARES  Order #:    [id number]                    Reading MD:    Measurements  Intervals                                Axis  Rate:       91                           P:          91  NC:         128                          QRS:        13  QRSD:       74                           T:          99  QT:         338  QTc:        416    Interpretive Statements  SINUS RHYTHM  EXTENSIVE ANTERIOR INFARCT, ACUTE (LAD)  Compared to ECG 2023 06:01:28  Sinus tachycardia no longer present  Atrial premature complex(es) no longer present  Myocardial infarct finding still present     EKG in four (4) hours    Collection Time: 23 10:27 AM   Result Value Ref Range    Report       [facility]    Test Date:  2023  Pt Name:    KIRK AN                 Department: ER  MRN:        [id number]                      Room:       13  Gender:     Female                       Technician: [email or web address]  :        1950                   Requested By:JUSTUS OLIVARES  Order #:    [id number]                    Reading MD:    Measurements  Intervals                                Axis  Rate:       93                           P:          86  NC:         142                          QRS:        24  QRSD:       77                           T:          90  QT:         369  QTc:        459    Interpretive Statements  SINUS RHYTHM  EXTENSIVE ANTERIOR INFARCT, ACUTE  (LAD)  Compared to ECG 09/02/2023 10:27:26  No significant changes         Imaging  EC-ECHOCARDIOGRAM COMPLETE W/ CONT   Final Result      DX-CHEST-PORTABLE (1 VIEW)   Final Result         1.  No acute cardiopulmonary disease.      CL-LEFT HEART CATHETERIZATION WITH POSSIBLE INTERVENTION    (Results Pending)       Assessment/Plan  No new Assessment & Plan notes have been filed under this hospital service since the last note was generated.  Service: Critical Care    Medical problems:  STEMI  Status post TNK  Thyroidism  Hypertension  Hyperlipidemia    Discussion:  This is a patient who was seen at the other facility for STEMI and received TNK today at 4:05 a.m. and was transferred to our facility for further management.     Plan:  The patient will be admitted to the ICU while the Cath Lab is getting ready for her.  We will continue with the heparin drip  Continue nitro drip  Patient received aspirin 325 already, progressively get recommendations we will start loading Plavix at 300.  Continue aspirin daily  Morphine as needed for pain  Continue with oxygen to keep saturations above 92%  Intensity statin ordered.  Patient will need to be on guidelines directed medical therapy, beta-blocker, ACE inhibitor and aldosterone antagonist diuretic were ordered to start tomorrow, pending results of the Cath Lab.  Monitor for any signs of bleeding.  Routine care for post TNK treatment  Check TSH with free T4  Monitor need for antihypertensive medications  Keep n.p.o. until after the procedure  Cardiology was consulted from the emergency department.  Obtain an echo      Discussed patient condition and risk of morbidity and/or mortality with RN, RT, Pharmacy, Patient, and Cardiology, ED MD .    The patient remains critically ill.  Critical care time = 40 minutes in directly providing and coordinating critical care and extensive data review.  No time overlap and excludes procedures.      Alexander Henning MD FACP  [facility]  Pulmonary/Critical Care

## 2023-09-02 NOTE — PROCEDURES
"CARDIAC CATHETERIZATION REPORT    Referring Provider: Candelario Shipman MD    PROCEDURE PHYSICIAN: Petr Virgen MD, [facility], [facility]  ASSISTANT: None    IMPRESSIONS:  1.  Anterior STEMI with persistent chest pain despite thrombolytics requiring emergent catheterization with PCI due to severe stenosis of the mid LAD  2.  Successful PCI of the proximal through mid LAD using 2 overlapping drug-eluting stents, OCT  3.  The patient is fully revascularized  4.  Severe LV systolic dysfunction with LVEF of 30%  5.  Mild elevation LVEDP at 20 mmHg    Recommendations:  Dual antiplatelet therapy, GDMT for heart failure    Pre-procedure diagnosis: STEMI -persistent chest pain despite thrombolysis requiring emergent catheterization  Post-procedure diagnosis: Same    Procedure performed  Selective coronary angiography  Left heart catheterization  Percutaneous coronary intervention - Stent Placement (LAD)  Optical coherence tomography (LAD)    Conscious sedation was supervised by myself and administered by trained personnel using fentanyl and versed between 826 and 926. The patient tolerated sedation without complication.     Procedure Description  1. Access: 5/6 Q[place] right radial artery Micropuncture technique was utilized following local anesthesia with lidocaine.  A radial cocktail containing verapamil and saline was administered in the radial artery sheath    2. Diagnostic description: The catheter was passed to the central circulation with the aide of J tipped 0.35\" wire. A 5F TIG 4.0 and 6F Pigtail were used to inject the coronary circulation  and inject the left ventricle during invasive hemodynamic monitoring.     3. Description of Intervention: After confirming therapeutic anticoagulation intervention proceeded. A 6F EBU 3.5 guiding catheter was used. The lesion in the LAD was crossed with a 0.014\" Prowater wire.  As the lesion appeared somewhat atypical in character I wanted to be sure that this was not a spontaneous " coronary artery dissection.  I then used an OCT and performed assessment of the proximal to mid vessel.  It appeared there was severe fibrous stenosis with minimal calcification in the mid to distal vessel as well as have a soft plaque burden throughout the proximal and midportion of the vessel without an appropriate intervening healthy landing zone.  The OCT measured a distal and proximal reference vessel size of 2.5 mm.  This was surprising and I suspected an accurate due to some error in the calibration of the machine.  I then predilated the lesion length with a 2.5 mm balloon which confirmed 2.5 mm would be undersized.  I then deployed a 3.0 x 38 mm Synergy XD JORDON at 14 shereen.  Unexpectedly, there was a persistent waist at the culprit site with stenosis reduced to only about 40% at this location.  I then returned with a 3.0 x 20 NC balloon and performed additional angioplasty at 20 shereen.  There was partial improvement.  The proximal vessel required additional attention and so I deployed a 3.5 x 20 mm Synergy XD JORDON at 12 shereen.  This did appear to be appropriately sized for the vessel and fully expanded.  I then used the stent balloon to perform angioplasty at the site that did not yield previously.  There was slight reduction in the stenosis severity.  I then returned with a 3.5 NC balloon and performed additional angioplasty at 16 shereen at the site as well as throughout the proximal portion of the vessel.  Not wanting to apply too much force to this region with a 3.5 balloon I returned with a 3.0 x 8 NC balloon and performed angioplasty at 24 shereen with resultant reduction in stenosis severity to around 20%.  This was felt to be a satisfactory result and angiogram showed absence of edge complications.    4. Closing: At completion of the procedure the relevant equipment was removed from the body and hemostasis achieved by Radial band    Findings   Hemodynamics:   Aorta: 103/68 mmHg  LVEDP: 20 mmHg  No significant  pullback gradient across the aortic valve    Coronary Anatomy   Left Main: Normal   LAD:  Diffuse plaque throughout the proximal to distal portion of the vessel with 70% proximal stenosis, 80% distal stenosis.  The LAD wraps around the apex.  By OCT there is a small amount of thrombus at the distal stenosis.  There is 1 significant diagonal branch with 30% ostial stenosis   Ramus: Small and normal   LCx:  30% stenosis in the mid AV groove.  There is 1 significant obtuse marginal branch on the lateral wall which is free of disease.   RCA: Dominant, 30 to 40% stenosis in the proximal segment of the vessel.  The PDA is small and normal.      Left Ventriculography:   LVEF= 30%. Akinesis in the mid to apical anterior wall and apex as well as apical inferior segment No mitral regurgitation Normal caliber ascending aorta      Results of intervention to the LAD:  Pre: 80% stenosis and LESLIE III flow  Post: 0% residual stenosis and LESLIE III flow. No dissection or distal embolization.    Technical Factors  1. Complications: None  2. Estimated Blood Loss: <50 cc  3. Specimens: None  4. Contrast Volume: 110 ml  5. Medications: Heparin to maintain ACT >250 Intracoronary nitroglycerin

## 2023-09-02 NOTE — PROGRESS NOTES
STEMI pre-alert   Door time: 0602  Patient transferred from outlying facility. Chest pain x3 hours (prior to transfer) with nausea and vomiting. Upon arrival patient states chest pain is still 3/10    Prior to arrival medications:  30 mg TNK @ 0405  4000 unit heparin bolus  Heparin drip at 12 unit/kg/hour  324 ASA  0.4 nitro  1g tylenol   4mg zofran     Patient disposition: roomed in emergency room. Patient will have scheduled cath at later time.

## 2023-09-03 PROBLEM — I25.5 ISCHEMIC CARDIOMYOPATHY: Status: ACTIVE | Noted: 2023-09-03

## 2023-09-03 LAB
ANION GAP SERPL CALC-SCNC: 12 MMOL/L (ref 7–16)
BASOPHILS # BLD AUTO: 0.1 % (ref 0–1.8)
BASOPHILS # BLD: 0.02 K/UL (ref 0–0.12)
BUN SERPL-MCNC: 22 MG/DL (ref 8–22)
CALCIUM SERPL-MCNC: 9.4 MG/DL (ref 8.5–10.5)
CHLORIDE SERPL-SCNC: 101 MMOL/L (ref 96–112)
CHOLEST SERPL-MCNC: 151 MG/DL (ref 100–199)
CO2 SERPL-SCNC: 24 MMOL/L (ref 20–33)
CREAT SERPL-MCNC: 0.66 MG/DL (ref 0.5–1.4)
EOSINOPHIL # BLD AUTO: 0 K/UL (ref 0–0.51)
EOSINOPHIL NFR BLD: 0 % (ref 0–6.9)
ERYTHROCYTE [DISTWIDTH] IN BLOOD BY AUTOMATED COUNT: 45.1 FL (ref 35.9–50)
EST. AVERAGE GLUCOSE BLD GHB EST-MCNC: 114 MG/DL
GFR SERPLBLD CREATININE-BSD FMLA CKD-EPI: 92 ML/MIN/1.73 M 2
GLUCOSE SERPL-MCNC: 142 MG/DL (ref 65–99)
HBA1C MFR BLD: 5.6 % (ref 4–5.6)
HCT VFR BLD AUTO: 43.5 % (ref 37–47)
HDLC SERPL-MCNC: 59 MG/DL
HGB BLD-MCNC: 14.3 G/DL (ref 12–16)
IMM GRANULOCYTES # BLD AUTO: 0.05 K/UL (ref 0–0.11)
IMM GRANULOCYTES NFR BLD AUTO: 0.3 % (ref 0–0.9)
LDLC SERPL CALC-MCNC: 79 MG/DL
LYMPHOCYTES # BLD AUTO: 0.88 K/UL (ref 1–4.8)
LYMPHOCYTES NFR BLD: 5.7 % (ref 22–41)
MCH RBC QN AUTO: 31 PG (ref 27–33)
MCHC RBC AUTO-ENTMCNC: 32.9 G/DL (ref 32.2–35.5)
MCV RBC AUTO: 94.4 FL (ref 81.4–97.8)
MONOCYTES # BLD AUTO: 0.85 K/UL (ref 0–0.85)
MONOCYTES NFR BLD AUTO: 5.6 % (ref 0–13.4)
NEUTROPHILS # BLD AUTO: 13.51 K/UL (ref 1.82–7.42)
NEUTROPHILS NFR BLD: 88.3 % (ref 44–72)
NRBC # BLD AUTO: 0 K/UL
NRBC BLD-RTO: 0 /100 WBC (ref 0–0.2)
PLATELET # BLD AUTO: 298 K/UL (ref 164–446)
PMV BLD AUTO: 10.3 FL (ref 9–12.9)
POTASSIUM SERPL-SCNC: 4.7 MMOL/L (ref 3.6–5.5)
RBC # BLD AUTO: 4.61 M/UL (ref 4.2–5.4)
SODIUM SERPL-SCNC: 137 MMOL/L (ref 135–145)
TRIGL SERPL-MCNC: 65 MG/DL (ref 0–149)
WBC # BLD AUTO: 15.3 K/UL (ref 4.8–10.8)

## 2023-09-03 PROCEDURE — 36415 COLL VENOUS BLD VENIPUNCTURE: CPT

## 2023-09-03 PROCEDURE — A9270 NON-COVERED ITEM OR SERVICE: HCPCS | Performed by: INTERNAL MEDICINE

## 2023-09-03 PROCEDURE — 85025 COMPLETE CBC W/AUTO DIFF WBC: CPT

## 2023-09-03 PROCEDURE — A9270 NON-COVERED ITEM OR SERVICE: HCPCS | Performed by: NURSE PRACTITIONER

## 2023-09-03 PROCEDURE — 770020 HCHG ROOM/CARE - TELE (206)

## 2023-09-03 PROCEDURE — 700111 HCHG RX REV CODE 636 W/ 250 OVERRIDE (IP): Mod: JZ | Performed by: INTERNAL MEDICINE

## 2023-09-03 PROCEDURE — 700102 HCHG RX REV CODE 250 W/ 637 OVERRIDE(OP): Performed by: INTERNAL MEDICINE

## 2023-09-03 PROCEDURE — A9270 NON-COVERED ITEM OR SERVICE: HCPCS | Performed by: HOSPITALIST

## 2023-09-03 PROCEDURE — 83036 HEMOGLOBIN GLYCOSYLATED A1C: CPT

## 2023-09-03 PROCEDURE — 80048 BASIC METABOLIC PNL TOTAL CA: CPT

## 2023-09-03 PROCEDURE — 99222 1ST HOSP IP/OBS MODERATE 55: CPT | Performed by: HOSPITALIST

## 2023-09-03 PROCEDURE — 700102 HCHG RX REV CODE 250 W/ 637 OVERRIDE(OP): Performed by: NURSE PRACTITIONER

## 2023-09-03 PROCEDURE — 99232 SBSQ HOSP IP/OBS MODERATE 35: CPT | Performed by: INTERNAL MEDICINE

## 2023-09-03 PROCEDURE — 700111 HCHG RX REV CODE 636 W/ 250 OVERRIDE (IP): Mod: JZ | Performed by: NURSE PRACTITIONER

## 2023-09-03 PROCEDURE — 80061 LIPID PANEL: CPT

## 2023-09-03 PROCEDURE — 700102 HCHG RX REV CODE 250 W/ 637 OVERRIDE(OP): Performed by: HOSPITALIST

## 2023-09-03 RX ORDER — LOSARTAN POTASSIUM 50 MG/1
50 TABLET ORAL
Status: DISCONTINUED | OUTPATIENT
Start: 2023-09-04 | End: 2023-09-06 | Stop reason: HOSPADM

## 2023-09-03 RX ORDER — SPIRONOLACTONE 25 MG/1
25 TABLET ORAL
Status: DISCONTINUED | OUTPATIENT
Start: 2023-09-04 | End: 2023-09-06 | Stop reason: HOSPADM

## 2023-09-03 RX ORDER — METOPROLOL TARTRATE 50 MG/1
50 TABLET, FILM COATED ORAL EVERY 8 HOURS
Status: DISCONTINUED | OUTPATIENT
Start: 2023-09-03 | End: 2023-09-04

## 2023-09-03 RX ORDER — ONDANSETRON 2 MG/ML
4 INJECTION INTRAMUSCULAR; INTRAVENOUS EVERY 6 HOURS PRN
Status: DISCONTINUED | OUTPATIENT
Start: 2023-09-03 | End: 2023-09-06 | Stop reason: HOSPADM

## 2023-09-03 RX ORDER — DAPAGLIFLOZIN 10 MG/1
10 TABLET, FILM COATED ORAL DAILY
Status: DISCONTINUED | OUTPATIENT
Start: 2023-09-03 | End: 2023-09-06 | Stop reason: HOSPADM

## 2023-09-03 RX ORDER — ENOXAPARIN SODIUM 100 MG/ML
40 INJECTION SUBCUTANEOUS DAILY
Status: DISCONTINUED | OUTPATIENT
Start: 2023-09-03 | End: 2023-09-06 | Stop reason: HOSPADM

## 2023-09-03 RX ORDER — LEVOTHYROXINE SODIUM 0.07 MG/1
75 TABLET ORAL
Status: DISCONTINUED | OUTPATIENT
Start: 2023-09-03 | End: 2023-09-06 | Stop reason: HOSPADM

## 2023-09-03 RX ADMIN — LEVOTHYROXINE SODIUM 75 MCG: 0.07 TABLET ORAL at 07:56

## 2023-09-03 RX ADMIN — SPIRONOLACTONE 12.5 MG: 25 TABLET ORAL at 06:15

## 2023-09-03 RX ADMIN — SENNOSIDES AND DOCUSATE SODIUM 2 TABLET: 50; 8.6 TABLET ORAL at 05:07

## 2023-09-03 RX ADMIN — POLYETHYLENE GLYCOL 3350 1 PACKET: 17 POWDER, FOR SOLUTION ORAL at 17:17

## 2023-09-03 RX ADMIN — LISINOPRIL 5 MG: 5 TABLET ORAL at 05:07

## 2023-09-03 RX ADMIN — ASPIRIN 81 MG: 81 TABLET, COATED ORAL at 05:07

## 2023-09-03 RX ADMIN — CLOPIDOGREL BISULFATE 75 MG: 75 TABLET ORAL at 06:15

## 2023-09-03 RX ADMIN — DAPAGLIFLOZIN 10 MG: 10 TABLET, FILM COATED ORAL at 14:25

## 2023-09-03 RX ADMIN — ENOXAPARIN SODIUM 40 MG: 100 INJECTION SUBCUTANEOUS at 17:17

## 2023-09-03 RX ADMIN — METOPROLOL TARTRATE 25 MG: 25 TABLET, FILM COATED ORAL at 05:07

## 2023-09-03 RX ADMIN — ATORVASTATIN CALCIUM 80 MG: 80 TABLET, FILM COATED ORAL at 17:16

## 2023-09-03 RX ADMIN — METOPROLOL TARTRATE 50 MG: 50 TABLET, FILM COATED ORAL at 13:41

## 2023-09-03 RX ADMIN — SENNOSIDES AND DOCUSATE SODIUM 2 TABLET: 50; 8.6 TABLET ORAL at 17:16

## 2023-09-03 RX ADMIN — METOPROLOL TARTRATE 50 MG: 50 TABLET, FILM COATED ORAL at 21:19

## 2023-09-03 RX ADMIN — ONDANSETRON 4 MG: 2 INJECTION INTRAMUSCULAR; INTRAVENOUS at 20:18

## 2023-09-03 ASSESSMENT — COGNITIVE AND FUNCTIONAL STATUS - GENERAL
DAILY ACTIVITIY SCORE: 24
SUGGESTED CMS G CODE MODIFIER MOBILITY: CH
MOBILITY SCORE: 24
SUGGESTED CMS G CODE MODIFIER DAILY ACTIVITY: CH
MOBILITY SCORE: 24
SUGGESTED CMS G CODE MODIFIER DAILY ACTIVITY: CH
SUGGESTED CMS G CODE MODIFIER MOBILITY: CH
DAILY ACTIVITIY SCORE: 24

## 2023-09-03 ASSESSMENT — PAIN DESCRIPTION - PAIN TYPE
TYPE: ACUTE PAIN

## 2023-09-03 ASSESSMENT — ENCOUNTER SYMPTOMS
PND: 0
PALPITATIONS: 0
ORTHOPNEA: 0
CLAUDICATION: 0

## 2023-09-03 ASSESSMENT — FIBROSIS 4 INDEX
FIB4 SCORE: 2.5
FIB4 SCORE: 2.5

## 2023-09-03 NOTE — DIETARY
Nutrition Services: Cardiac Education Consult   Day 1 of admit.  Kristie Duarte is a 72 y.o. female with admitting DX of STEMI (ST elevation myocardial infarction)    RD able to visit pt at bedside to provide heart healthy diet education. RD discussed low sodium, low saturated fat, appropriate proteins and fats with pt.  RD provided handout reinforcing topics discussed. Pt demonstrated appropriate readiness and adequate evidence of learning. RD able to answer all questions to patient's satisfaction.     No other education needs identified at this time. Consider referral to outpatient nutrition services for continuation of education as indicated or per pt preferences.     Please re-consult RD as indicated.

## 2023-09-03 NOTE — ASSESSMENT & PLAN NOTE
Echocardiogram reviewed EF 30%  Continue  losartan spironolactone  farxiga added  Switch to Toprol- twice daily    Discussed need to follow-up with cardiology after she returns home in [facility]

## 2023-09-03 NOTE — PROGRESS NOTES
Monitor Summary  Rhythm: Sinus / Sinus Tach With slight ST Elevation  Ectopy: occasional to frequent PACs  Rate: 80s-120s  Measurements: 0.14/0.09/0.48

## 2023-09-03 NOTE — CONSULTS
Hospital Medicine Consultation    Date of Service  9/3/2023    Referring Physician  Dung Winters M.D.    Consulting Physician  Paolo Vega M.D.    Reason for Consultation  Take over medical care    History of Presenting Illness  72 y.o. female who presented 9/2/2023 with history of hypertension presented to the local emergency department for evaluation of chest pain and palpitations was noted to have STEMI she received TNK therapy started on heparin aspirin and transferred to our facility for higher level of care.  She was evaluated by cardiology and taken f to the Cath Lab and underwent successful PCI of the proximal through mid LAD with 2 JORDON stents.  EF was 30% during angiogram.  She was admitted to the ICU and started on medical therapy.    The patient denies any chest pain this morning.  She is currently on room air.  She is afebrile pulse is 100 I have increased her metoprolol.  I reviewed telemetry strips .  Patient denies any dyspnea.  She does not smoke denies any history of drug use and has no family history of premature atherosclerosis.    Review of Systems  Review of Systems   All other systems reviewed and are negative.      Past Medical History  -Hypertension, hypothyroidism    Surgical History  Reviewed and not pertinent to the presenting problem    Family History  family history is not on file.    Social History   She does not smoke no alcohol illicit drug use    Medications  Current Facility-Administered Medications   Medication Dose Route Frequency Provider Last Rate Last Admin    levothyroxine (Synthroid) tablet 75 mcg  75 mcg Oral AM ES Dung Winters M.D.   75 mcg at 09/03/23 0756    enoxaparin (Lovenox) inj 40 mg  40 mg Subcutaneous DAILY AT 1800 Dung Winters M.D.        metoprolol tartrate (Lopressor) tablet 50 mg  50 mg Oral Q8HRS Paolo Vega M.D.        [START ON 9/4/2023] losartan (Cozaar) tablet 50 mg  50 mg Oral Q DAY TIFFANIE Gustafson        [START ON  9/4/2023] spironolactone (Aldactone) tablet 25 mg  25 mg Oral Q DAY Herberth Olmedo, A.P.N.        dapagliflozin propanediol (Farxiga) tablet 10 mg  10 mg Oral DAILY Herberth Olmedo, A.P.N.        senna-docusate (Pericolace Or Senokot S) 8.6-50 MG per tablet 2 Tablet  2 Tablet Oral BID Alexander Henning M.D.   2 Tablet at 09/03/23 0507    And    polyethylene glycol/lytes (Miralax) PACKET 1 Packet  1 Packet Oral QDAY PRN Alexander Henning M.D.        And    magnesium hydroxide (Milk Of Magnesia) suspension 30 mL  30 mL Oral QDAY PRN Alexander Henning M.D.        And    bisacodyl (Dulcolax) suppository 10 mg  10 mg Rectal QDAY PRN Alexander Henning M.D.        atorvastatin (Lipitor) tablet 80 mg  80 mg Oral Q EVENING Alexander Henning M.D.   80 mg at 09/02/23 1708    morphine 4 MG/ML injection 2-4 mg  2-4 mg Intravenous Q5 MIN PRN Alexander Henning M.D.        aspirin EC tablet 81 mg  81 mg Oral DAILY Petr Virgen M.D.   81 mg at 09/03/23 0507    clopidogrel (Plavix) tablet 75 mg  75 mg Oral DAILY Petr Virgen M.D.   75 mg at 09/03/23 0615       Allergies  No Known Allergies    Physical Exam  Temp:  [35.9 °C (96.6 °F)-37.2 °C (99 °F)] 37.2 °C (99 °F)  Pulse:  [] 99  Resp:  [16-23] 16  BP: ()/(51-91) 105/75  SpO2:  [92 %-96 %] 94 %    Physical Exam  Vitals and nursing note reviewed.   Constitutional:       General: She is not in acute distress.  HENT:      Head: Normocephalic and atraumatic.      Nose: Nose normal. No rhinorrhea.      Mouth/Throat:      Pharynx: No oropharyngeal exudate or posterior oropharyngeal erythema.   Eyes:      General: No scleral icterus.        Right eye: No discharge.         Left eye: No discharge.   Cardiovascular:      Rate and Rhythm: Regular rhythm. Tachycardia present.      Heart sounds: Normal heart sounds. No murmur heard.     No friction rub. No gallop.   Pulmonary:      Effort: Pulmonary effort is normal. No respiratory distress.      Breath sounds:  Normal breath sounds. No stridor. No wheezing, rhonchi or rales.   Chest:      Chest wall: No tenderness.   Abdominal:      General: Bowel sounds are normal. There is no distension.      Palpations: Abdomen is soft. There is no mass.      Tenderness: There is no abdominal tenderness. There is no rebound.   Musculoskeletal:         General: No swelling or tenderness.      Cervical back: Neck supple. No rigidity.   Skin:     General: Skin is warm and dry.      Coloration: Skin is not cyanotic or jaundiced.      Nails: There is no clubbing.   Neurological:      General: No focal deficit present.      Mental Status: She is alert and oriented to person, place, and time.      Cranial Nerves: No cranial nerve deficit.      Motor: No weakness.   Psychiatric:         Mood and Affect: Mood normal.         Behavior: Behavior normal.       Fluids  Date 09/03/23 0700 - 09/04/23 0659   Shift 3922-5686 2535-7046 2724-9380 24 Hour Total   INTAKE   P.O. 100   100   Shift Total 100   100   OUTPUT   Shift Total       Weight (kg) 55.1 55.1 55.1 55.1       Laboratory  Recent Labs     09/02/23  0607 09/03/23  0416   WBC 18.2* 15.3*   RBC 4.95 4.61   HEMOGLOBIN 15.4 14.3   HEMATOCRIT 45.6 43.5   MCV 92.1 94.4   MCH 31.1 31.0   MCHC 33.8 32.9   RDW 42.8 45.1   PLATELETCT 312 298   MPV 10.7 10.3     Recent Labs     09/02/23  0607 09/03/23  0416   SODIUM 140 137   POTASSIUM 4.0 4.7   CHLORIDE 102 101   CO2 25 24   GLUCOSE 189* 142*   BUN 20 22   CREATININE 0.80 0.66   CALCIUM 9.5 9.4     Recent Labs     09/02/23  0607   APTT 84.0*   INR 1.04          Recent Labs     09/03/23  0416   TRIGLYCERIDE 65   HDL 59   LDL 79        Imaging  EC-ECHOCARDIOGRAM COMPLETE W/ CONT   Final Result      DX-CHEST-PORTABLE (1 VIEW)   Final Result         1.  No acute cardiopulmonary disease.      CL-LEFT HEART CATHETERIZATION WITH POSSIBLE INTERVENTION    (Results Pending)       Assessment/Plan  * STEMI (ST elevation myocardial infarction) (HCC)- (present on  admission)  Assessment & Plan  Status post TNK and transferred to our facility where she underwent urgent cardiac catheterization with PCI to proximal through mid LAD with 2 JORDON stents  Continue current medical therapy with aspirin Plavix losartan metoprolol spironolactone farxiga added  Discussed with cardiology    Ischemic cardiomyopathy  Assessment & Plan  Echocardiogram reviewed EF 30%  Continue metoprolol losartan spironolactone  farxiga added    Discussed need to follow-up with cardiology after she returns home in [facility]    Hypothyroidism due to Hashimoto's thyroiditis- (present on admission)  Assessment & Plan  Continue levothyroxine  TSH normal    Hyperlipidemia  Assessment & Plan  Continue atorvastatin

## 2023-09-03 NOTE — PROGRESS NOTES
Patient transported to room 724-1 with ICU RN. Report received and assumed patient care. Pt A&O x 4 and on room air. No signs of distress noted at this time. Pt placed on the tele box and monitors were notified of arrival. Pt updated on plan of care for the day and has call light within reach. Fall precautions are in place.

## 2023-09-03 NOTE — PROGRESS NOTES
Med Rec complete per patient   Allergies reviewed  No oral antibiotics in the last 30 days  Preferred pharmacy: [facility]    Pt states she doesn't take her vitamins every day only if she feels like taking them

## 2023-09-03 NOTE — PROGRESS NOTES
Cardiology Daily Progress Note    Date of Service  9/3/2023    Chief Complaint  Kristie Duarte is a 72 y.o. female admitted 9/2/2023 from outside facility for evaluation of STEMI received TNK and was transferred to [facility] underwent successful PCI of the proximal through mid LAD using 2 overlapping drug-eluting stents on 9/2/2023.      Interval Problem Update      Telemetry-SR/ST, brief episodes of heart rate 160-170s, feels palpitations  BP appropriate  Scr stable  LVEF 30  Appears euvolemic    Review of Systems  Review of Systems   Cardiovascular:  Negative for chest pain, palpitations, orthopnea, claudication, leg swelling and PND.        Physical Exam  Temp:  [35.9 °C (96.6 °F)-37.2 °C (99 °F)] 37.2 °C (99 °F)  Pulse:  [] 99  Resp:  [16-23] 16  BP: ()/(51-91) 105/75  SpO2:  [92 %-96 %] 94 %    Physical Exam  Cardiovascular:      Rate and Rhythm: Normal rate and regular rhythm.      Pulses: Normal pulses.      Comments: Brief episodes of sinus tach rate 170s  Skin:     General: Skin is warm.      Comments: Right radial cath site without evidence of hematoma   Neurological:      Mental Status: She is alert. Mental status is at baseline.   Psychiatric:         Mood and Affect: Mood normal.         Fluids    Intake/Output Summary (Last 24 hours) at 9/3/2023 1305  Last data filed at 9/3/2023 0800  Gross per 24 hour   Intake 641.82 ml   Output 300 ml   Net 341.82 ml       Laboratory  Recent Labs     09/02/23  0607 09/03/23 0416   WBC 18.2* 15.3*   RBC 4.95 4.61   HEMOGLOBIN 15.4 14.3   HEMATOCRIT 45.6 43.5   MCV 92.1 94.4   MCH 31.1 31.0   MCHC 33.8 32.9   RDW 42.8 45.1   PLATELETCT 312 298   MPV 10.7 10.3     Recent Labs     09/02/23  0607 09/03/23  0416   SODIUM 140 137   POTASSIUM 4.0 4.7   CHLORIDE 102 101   CO2 25 24   GLUCOSE 189* 142*   BUN 20 22   CREATININE 0.80 0.66   CALCIUM 9.5 9.4     Recent Labs     09/02/23  0607   APTT 84.0*   INR 1.04         Recent Labs     09/03/23  0416   TRIGLYCERIDE 65    HDL 59   LDL 79       Imaging  EC-ECHOCARDIOGRAM COMPLETE W/ CONT   Final Result      DX-CHEST-PORTABLE (1 VIEW)   Final Result         1.  No acute cardiopulmonary disease.      CL-LEFT HEART CATHETERIZATION WITH POSSIBLE INTERVENTION    (Results Pending)        Assessment/Plan  -Anterior STEMI status post TNK.  -Echo revealed ischemic cardiomyopathy LVEF 30%  -Hypertension  -Hyperlipidemia  -A1c pending  -LVEDP 20        Recommendations  -Underwent successful PCI of the proximal/mid LAD x2 overlapping drug-eluting stents, 9/2/2023.   -Continue DAPT aspirin 81, Plavix 75 mg along with atorvastatin 80 mg, metoprolol 50 every 8 transition to Toprol-XL in am, lisinopril 5 mg ( switch to losartan ), eventually Entresto outpatient.  -Uptitrate spironolactone 25 mg.  -Add Farxiga 10 mg daily.  -Cardiology will follow along    Lives in [facility]    I personally spent a total of 15  minutes which includes face-to-face time and non-face-to-face time spent on preparing to see the patient, reviewing hospital notes and tests, obtaining history from the patient, performing a medically appropriate exam, counseling and educating the patient, ordering medications/tests/procedures/referrals as clinically indicated, and documenting information in the electronic medical record.

## 2023-09-03 NOTE — ASSESSMENT & PLAN NOTE
Status post TNK and transferred to our facility where she underwent urgent cardiac catheterization with PCI to proximal through mid LAD with 2 JORDON stents  Continue current medical therapy with aspirin Plavix losartan   spironolactone farxiga   Metoprolol increased to 100 twice daily  Discussed with cardiology

## 2023-09-03 NOTE — HOSPITAL COURSE
72 y.o. female who presented 9/2/2023 with history of hypertension presented to the local emergency department for evaluation of chest pain and palpitations was noted to have STEMI she received TNK therapy started on heparin aspirin and transferred to our facility for higher level of care.  She was evaluated by cardiology and taken f to the Cath Lab and underwent successful PCI of the proximal through mid LAD with 2 JORDON stents.  EF was 30% during angiogram.  She was admitted to the ICU and started on medical therapy.

## 2023-09-03 NOTE — CARE PLAN
The patient is Watcher - Medium risk of patient condition declining or worsening    Shift Goals  Clinical Goals: Neuro assessments  Patient Goals: Feel better, Rest    Progress made toward(s) clinical / shift goals:        Problem: Knowledge Deficit - Standard  Goal: Patient and family/care givers will demonstrate understanding of plan of care, disease process/condition, diagnostic tests and medications  Outcome: Progressing     Problem: Pain - Standard  Goal: Alleviation of pain or a reduction in pain to the patient’s comfort goal  Outcome: Progressing     Problem: Psychosocial  Goal: Patient's level of anxiety will decrease  Outcome: Progressing  Goal: Patient's ability to verbalize feelings about condition will improve  Outcome: Progressing  Goal: Patient's ability to re-evaluate and adapt role responsibilities will improve  Outcome: Progressing  Goal: Patient and family will demonstrate ability to cope with life altering diagnosis and/or procedure  Outcome: Progressing     Problem: Fluid Volume  Goal: Fluid volume balance will be maintained  Outcome: Progressing

## 2023-09-04 ENCOUNTER — APPOINTMENT (OUTPATIENT)
Dept: RADIOLOGY | Facility: MEDICAL CENTER | Age: 73
DRG: 246 | End: 2023-09-04
Payer: MEDICARE

## 2023-09-04 PROBLEM — I47.10 PSVT (PAROXYSMAL SUPRAVENTRICULAR TACHYCARDIA) (HCC): Status: ACTIVE | Noted: 2023-09-04

## 2023-09-04 LAB
ANION GAP SERPL CALC-SCNC: 12 MMOL/L (ref 7–16)
BUN SERPL-MCNC: 21 MG/DL (ref 8–22)
CALCIUM SERPL-MCNC: 8.6 MG/DL (ref 8.5–10.5)
CHLORIDE SERPL-SCNC: 100 MMOL/L (ref 96–112)
CO2 SERPL-SCNC: 22 MMOL/L (ref 20–33)
CREAT SERPL-MCNC: 0.66 MG/DL (ref 0.5–1.4)
EKG IMPRESSION: NORMAL
EKG IMPRESSION: NORMAL
ERYTHROCYTE [DISTWIDTH] IN BLOOD BY AUTOMATED COUNT: 43.4 FL (ref 35.9–50)
GFR SERPLBLD CREATININE-BSD FMLA CKD-EPI: 92 ML/MIN/1.73 M 2
GLUCOSE SERPL-MCNC: 123 MG/DL (ref 65–99)
HCT VFR BLD AUTO: 41.2 % (ref 37–47)
HGB BLD-MCNC: 14 G/DL (ref 12–16)
MAGNESIUM SERPL-MCNC: 2.2 MG/DL (ref 1.5–2.5)
MCH RBC QN AUTO: 31.4 PG (ref 27–33)
MCHC RBC AUTO-ENTMCNC: 34 G/DL (ref 32.2–35.5)
MCV RBC AUTO: 92.4 FL (ref 81.4–97.8)
PLATELET # BLD AUTO: 277 K/UL (ref 164–446)
PMV BLD AUTO: 10.9 FL (ref 9–12.9)
POTASSIUM SERPL-SCNC: 4.1 MMOL/L (ref 3.6–5.5)
RBC # BLD AUTO: 4.46 M/UL (ref 4.2–5.4)
SODIUM SERPL-SCNC: 134 MMOL/L (ref 135–145)
WBC # BLD AUTO: 14.5 K/UL (ref 4.8–10.8)

## 2023-09-04 PROCEDURE — 93010 ELECTROCARDIOGRAM REPORT: CPT | Mod: 76 | Performed by: INTERNAL MEDICINE

## 2023-09-04 PROCEDURE — 700102 HCHG RX REV CODE 250 W/ 637 OVERRIDE(OP): Performed by: NURSE PRACTITIONER

## 2023-09-04 PROCEDURE — 700102 HCHG RX REV CODE 250 W/ 637 OVERRIDE(OP): Performed by: HOSPITALIST

## 2023-09-04 PROCEDURE — A9270 NON-COVERED ITEM OR SERVICE: HCPCS | Performed by: INTERNAL MEDICINE

## 2023-09-04 PROCEDURE — 71045 X-RAY EXAM CHEST 1 VIEW: CPT

## 2023-09-04 PROCEDURE — 700102 HCHG RX REV CODE 250 W/ 637 OVERRIDE(OP): Performed by: INTERNAL MEDICINE

## 2023-09-04 PROCEDURE — 85027 COMPLETE CBC AUTOMATED: CPT

## 2023-09-04 PROCEDURE — 36415 COLL VENOUS BLD VENIPUNCTURE: CPT

## 2023-09-04 PROCEDURE — 700111 HCHG RX REV CODE 636 W/ 250 OVERRIDE (IP): Mod: JZ | Performed by: INTERNAL MEDICINE

## 2023-09-04 PROCEDURE — 99232 SBSQ HOSP IP/OBS MODERATE 35: CPT | Performed by: HOSPITALIST

## 2023-09-04 PROCEDURE — 80048 BASIC METABOLIC PNL TOTAL CA: CPT

## 2023-09-04 PROCEDURE — A9270 NON-COVERED ITEM OR SERVICE: HCPCS | Performed by: NURSE PRACTITIONER

## 2023-09-04 PROCEDURE — 83735 ASSAY OF MAGNESIUM: CPT

## 2023-09-04 PROCEDURE — 93010 ELECTROCARDIOGRAM REPORT: CPT | Performed by: INTERNAL MEDICINE

## 2023-09-04 PROCEDURE — 700101 HCHG RX REV CODE 250

## 2023-09-04 PROCEDURE — 93005 ELECTROCARDIOGRAM TRACING: CPT | Performed by: HOSPITALIST

## 2023-09-04 PROCEDURE — 93005 ELECTROCARDIOGRAM TRACING: CPT

## 2023-09-04 PROCEDURE — 770020 HCHG ROOM/CARE - TELE (206)

## 2023-09-04 PROCEDURE — A9270 NON-COVERED ITEM OR SERVICE: HCPCS | Performed by: HOSPITALIST

## 2023-09-04 RX ORDER — METOPROLOL TARTRATE 1 MG/ML
5 INJECTION, SOLUTION INTRAVENOUS
Status: DISCONTINUED | OUTPATIENT
Start: 2023-09-04 | End: 2023-09-06 | Stop reason: HOSPADM

## 2023-09-04 RX ORDER — METOPROLOL TARTRATE 1 MG/ML
5 INJECTION, SOLUTION INTRAVENOUS
Status: DISCONTINUED | OUTPATIENT
Start: 2023-09-04 | End: 2023-09-04

## 2023-09-04 RX ORDER — METOPROLOL SUCCINATE 50 MG/1
100 TABLET, EXTENDED RELEASE ORAL 2 TIMES DAILY
Status: DISCONTINUED | OUTPATIENT
Start: 2023-09-04 | End: 2023-09-05

## 2023-09-04 RX ADMIN — CLOPIDOGREL BISULFATE 75 MG: 75 TABLET ORAL at 05:12

## 2023-09-04 RX ADMIN — ENOXAPARIN SODIUM 40 MG: 100 INJECTION SUBCUTANEOUS at 17:31

## 2023-09-04 RX ADMIN — METOPROLOL TARTRATE 5 MG: 5 INJECTION INTRAVENOUS at 02:55

## 2023-09-04 RX ADMIN — METOPROLOL TARTRATE 50 MG: 50 TABLET, FILM COATED ORAL at 05:12

## 2023-09-04 RX ADMIN — ATORVASTATIN CALCIUM 80 MG: 80 TABLET, FILM COATED ORAL at 17:32

## 2023-09-04 RX ADMIN — METOPROLOL TARTRATE 50 MG: 50 TABLET, FILM COATED ORAL at 14:37

## 2023-09-04 RX ADMIN — METOPROLOL SUCCINATE 100 MG: 50 TABLET, EXTENDED RELEASE ORAL at 17:31

## 2023-09-04 RX ADMIN — SENNOSIDES AND DOCUSATE SODIUM 2 TABLET: 50; 8.6 TABLET ORAL at 05:14

## 2023-09-04 RX ADMIN — DAPAGLIFLOZIN 10 MG: 10 TABLET, FILM COATED ORAL at 05:13

## 2023-09-04 RX ADMIN — LOSARTAN POTASSIUM 50 MG: 50 TABLET, FILM COATED ORAL at 05:12

## 2023-09-04 RX ADMIN — ASPIRIN 81 MG: 81 TABLET, COATED ORAL at 05:12

## 2023-09-04 RX ADMIN — SPIRONOLACTONE 25 MG: 25 TABLET ORAL at 05:13

## 2023-09-04 RX ADMIN — LEVOTHYROXINE SODIUM 75 MCG: 0.07 TABLET ORAL at 05:13

## 2023-09-04 ASSESSMENT — PAIN DESCRIPTION - PAIN TYPE
TYPE: ACUTE PAIN
TYPE: ACUTE PAIN

## 2023-09-04 ASSESSMENT — ENCOUNTER SYMPTOMS
ABDOMINAL PAIN: 0
CHEST TIGHTNESS: 0
BLOOD IN STOOL: 0
PALPITATIONS: 1
SHORTNESS OF BREATH: 0
DIZZINESS: 0
FEVER: 0

## 2023-09-04 NOTE — CARE PLAN
The patient is Watcher - Medium risk of patient condition declining or worsening    Shift Goals  Clinical Goals: hemodynamic stability  Patient Goals: comfort, rest  Family Goals: EYAL    Progress made toward(s) clinical / shift goals:  Patient has had no complaints of chest pain during the shift. Pt has a few episodes of tachycardia but VS have been stable    Problem: Knowledge Deficit - Standard  Goal: Patient and family/care givers will demonstrate understanding of plan of care, disease process/condition, diagnostic tests and medications  Outcome: Progressing     Problem: Pain - Standard  Goal: Alleviation of pain or a reduction in pain to the patient’s comfort goal  Outcome: Progressing     Problem: Psychosocial  Goal: Patient's ability to verbalize feelings about condition will improve  Outcome: Progressing     Problem: Fluid Volume  Goal: Fluid volume balance will be maintained  Outcome: Progressing       Patient is not progressing towards the following goals:

## 2023-09-04 NOTE — PROGRESS NOTES
NOC HOSPITALIST CROSS COVER    Notified by RN regarding paroxysmal SVT with rates up to 200 on the monitor that are nonsustained. She remains tachycardic in the low 100s-120s, with frequent PACs. The patient is asymptomatic and sleeping. She is s/p PCI to the prox to mid LAD on . Post-cath echocardiogram demonstrated new ischemic cardiomyopathy with an EF of 30%.     Vitals:    23 0000   BP: 109/67   Pulse: 82   Resp: 18   Temp: 36.4 °C (97.6 °F)   SpO2: 94%      EK lead EKG showed tachycardia, rate 113, with PACs and diffuse ST elevation throughout    Assessmnet/Plan:  #PSVT  -Frequent runs of paroxysmal SVT up to the 200s, nonsustained  -Ongoing sinus tachycardia with frequent PACs  -Diffuse ST elevation on EKG likely rate related in the setting of persistent tachycardia and ischemic cardiomyopathy  -Lopressor 5 mg IV every 5 min x 3 doses for HR > 120  -Continue tele monitoring  -Notify with any onset of symptoms such as shortness of breath, chest pain, palpitations, or dizziness/lightheadedness    Update: Notified cardiology regarding EKG findings. Cardiology in agreement with beta-blockade     -----------------------------------------------------------------------------------------------------------    Electronically signed by:  German Marinelli, PASHA, APRN, ONESIMOP-BC  Hospitalist Services

## 2023-09-04 NOTE — CARE PLAN
Problem: Pain - Standard  Goal: Alleviation of pain or a reduction in pain to the patient’s comfort goal  Description: Target End Date:  Prior to discharge or change in level of care    Document on Vitals flowsheet    1.  Document pain using the appropriate pain scale per order or unit policy  2.  Educate and implement non-pharmacologic comfort measures (i.e. relaxation, distraction, massage, cold/heat therapy, etc.)  3.  Pain management medications as ordered  4.  Reassess pain after pain med administration per policy  5.  If opiods administered assess patient's response to pain medication is appropriate per POSS sedation scale  6.  Follow pain management plan developed in collaboration with patient and interdisciplinary team (including palliative care or pain specialists if applicable)  Outcome: Progressing     Problem: Psychosocial  Goal: Patient's level of anxiety will decrease  Description: Target End Date:  1-3 days or as soon as patient condition allows    1.  Collaborate with patient and family/caregiver to identify triggers and develop strategies to cope with anxiety  2.  Implement stimuli reduction, calming techniques  3.  Pharmacologic management per provider order  4.  Encourage patient/family/care giver participation  5.  Collaborate with interdisciplinary team including Psychologist or Behavioral Health Team as needed  Outcome: Progressing     Problem: Fluid Volume  Goal: Fluid volume balance will be maintained  Description: Target End Date:  Prior to discharge or change in level of care    Document on I/O flowsheet    1.  Monitor intake and output as ordered  2.  Promote oral intake as appropriate  3.  Report inadequate intake or output to physician  4.  Administer IV therapy as ordered  5.  Weights per provider order  6.  Assess for signs and symptoms of bleeding  7.  Monitor for signs of fluid overload (respiratory changes, edema, weight gain, increased abdominal girth)  8.  Monitor of signs for  inadequate fluid volume (poor skin turgor, dry mucous membranes)  9.  Instruct patient on adherence to fluid restrictions  Outcome: Progressing   The patient is Watcher - Medium risk of patient condition declining or worsening    Shift Goals  Clinical Goals: hemodynamic stability  Patient Goals: rest  Family Goals: rest    Progress made toward(s) clinical / shift goals:  pain management per MAR, diurese, set up discharge, fall precautions in place    Patient is not progressing towards the following goals:

## 2023-09-04 NOTE — PROGRESS NOTES
"Cardiology Follow Up Progress Note    Date of Service  9/4/2023    Attending Physician  Paolo Vega M.D.    Cardiology consulted for STEMI.     THAD Duarte is a 72 y.o. female admitted on  9/2/2023 from outside facility for evaluation of STEMI received TNK and was transferred to [facility] underwent successful PCI of the proximal through mid LAD using 2 overlapping drug-eluting stents on 9/2/2023.     TTE showed LVEF 30% with WMA, no valvular abnormality and mild pHTN (RVSP 40 mmHg).     Interim Events  Non-sustained pSVT overnight. Patient overall asymptomatic. At times feels briefly her heart rate is elevated. States that she has \"always had a fast heart rate\" and has experienced these palpitations for years prior to her recent STEMI. Ambulating around the unit without difficulty.   Overall reports feeling well.   Mag 2.2 this am. Chem panel WNL except Na 134.     Review of Systems  Review of Systems   Constitutional:  Negative for fever.   Respiratory:  Negative for chest tightness and shortness of breath.    Cardiovascular:  Positive for palpitations. Negative for chest pain and leg swelling.   Gastrointestinal:  Negative for abdominal pain and blood in stool.   Genitourinary:  Negative for hematuria.   Musculoskeletal:  Negative for gait problem.   Neurological:  Negative for dizziness and syncope.   All other systems reviewed and are negative.      Vital signs in last 24 hours  Temp:  [36.3 °C (97.3 °F)-36.6 °C (97.9 °F)] 36.5 °C (97.7 °F)  Pulse:  [] 93  Resp:  [16-18] 18  BP: ()/(54-82) 89/54  SpO2:  [92 %-99 %] 94 %    Physical Exam  Physical Exam  Constitutional:       General: She is not in acute distress.     Appearance: Normal appearance.   HENT:      Head: Normocephalic.   Eyes:      Extraocular Movements: Extraocular movements intact.   Cardiovascular:      Rate and Rhythm: Normal rate and regular rhythm.      Pulses: Normal pulses.      Heart sounds: Normal heart sounds. "   Pulmonary:      Effort: Pulmonary effort is normal.      Breath sounds: Normal breath sounds.   Abdominal:      Palpations: Abdomen is soft.      Tenderness: There is no abdominal tenderness.   Musculoskeletal:      Cervical back: Normal range of motion.      Right lower leg: No edema.      Left lower leg: No edema.   Skin:     General: Skin is warm and dry.   Neurological:      Mental Status: She is alert and oriented to person, place, and time.   Psychiatric:         Mood and Affect: Mood normal.         Behavior: Behavior normal.         Thought Content: Thought content normal.         Lab Review  Lab Results   Component Value Date/Time    WBC 14.5 (H) 09/04/2023 03:55 AM    RBC 4.46 09/04/2023 03:55 AM    HEMOGLOBIN 14.0 09/04/2023 03:55 AM    HEMATOCRIT 41.2 09/04/2023 03:55 AM    MCV 92.4 09/04/2023 03:55 AM    MCH 31.4 09/04/2023 03:55 AM    MCHC 34.0 09/04/2023 03:55 AM    MPV 10.9 09/04/2023 03:55 AM      Lab Results   Component Value Date/Time    SODIUM 134 (L) 09/04/2023 03:55 AM    POTASSIUM 4.1 09/04/2023 03:55 AM    CHLORIDE 100 09/04/2023 03:55 AM    CO2 22 09/04/2023 03:55 AM    GLUCOSE 123 (H) 09/04/2023 03:55 AM    BUN 21 09/04/2023 03:55 AM    CREATININE 0.66 09/04/2023 03:55 AM      Lab Results   Component Value Date/Time    ASTSGOT 42 09/02/2023 06:07 AM    ALTSGPT 17 09/02/2023 06:07 AM     Lab Results   Component Value Date/Time    CHOLSTRLTOT 151 09/03/2023 04:16 AM    LDL 79 09/03/2023 04:16 AM    HDL 59 09/03/2023 04:16 AM    TRIGLYCERIDE 65 09/03/2023 04:16 AM    TROPONINT 1135 (H) 09/02/2023 06:07 AM       Recent Labs     09/02/23  0607   NTPROBNP 2666*       Cardiac Imaging and Procedures Review  TTE 9/2/2023:  CONCLUSIONS  No prior study is available for comparison.   Severely reduced left ventricular systolic function.  LV EF measured 30%.  Akinesis of the LV apex, apical lateral, apical anterior, apical   septal, and mid anterolateral walls. Pattern compatible with LAD   territory  "infarction.    [facility] 9/2/2023:  IMPRESSIONS:  1.  Anterior STEMI with persistent chest pain despite thrombolytics requiring emergent catheterization with PCI due to severe stenosis of the mid LAD  2.  Successful PCI of the proximal through mid LAD using 2 overlapping drug-eluting stents, OCT  3.  The patient is fully revascularized  4.  Severe LV systolic dysfunction with LVEF of 30%  5.  Mild elevation LVEDP at 20 mmHg       Assessment/Plan  CAD S/P STEMI:  - Underwent successful PCI of the proximal/mid LAD x2 overlapping drug-eluting stents on 9/2/2023. Residual non-obstructive CAD noted.  - Continue DAPT (aspirin 81 and Plavix 75 mg), Atorvastatin 80 mg daily, Losartan 50 mg daily and BB as below.     HFrEF, Stage C, Class I, LVEF 30%:   -Heart failure due to ischemia, recent STEMI.   -ACE-I/ARB/ARNI: Losartan 50 mg daily.   -Evidence Based Beta-blocker: Stop lopressor, start Toprol  mg BID.   -Aldosterone Antagonist: Aldactone 25 mg daily.   -Diuretic: NA, euvolemic.   -SGLT2 inhibitor: Farxiga 10 mg daily.   -Repeat Echo in 3 months, if LVEF not >35%, then will need to discuss/consider ICD for primary Prevention.     pSVT:  - Brief, non-sustained episodes. Patient reports history of intermittent, brief palpitations for \"years\" and does not feel her symptoms have changed since before her MI.  - We discussed treatment options. Patient wishes to try only increase her beta blocker now and wait to discuss further with a cardiologist once back home in [place].   - Stopped lopressor 50 TID and increased Toprol XL to 100 mg BID.      Lives in [facility], prefers to establish with cardiology there.     Thank you for allowing me to participate in the care of this patient.    Please contact me with any questions.    NIKHIL Fischer.   Cardiology, [facility]  [phone]    My total time spent caring for the patient on the day of the encounter was 12 minutes. "   This does not include time spent on separately billable procedures/tests.

## 2023-09-05 ENCOUNTER — PATIENT OUTREACH (OUTPATIENT)
Dept: SCHEDULING | Facility: IMAGING CENTER | Age: 73
End: 2023-09-05
Payer: MEDICARE

## 2023-09-05 LAB
ACT BLD: 227 SEC (ref 74–137)
ACT BLD: 227 SEC (ref 74–137)

## 2023-09-05 PROCEDURE — A9270 NON-COVERED ITEM OR SERVICE: HCPCS | Performed by: NURSE PRACTITIONER

## 2023-09-05 PROCEDURE — 99233 SBSQ HOSP IP/OBS HIGH 50: CPT | Performed by: INTERNAL MEDICINE

## 2023-09-05 PROCEDURE — RXMED WILLOW AMBULATORY MEDICATION CHARGE: Performed by: INTERNAL MEDICINE

## 2023-09-05 PROCEDURE — 97161 PT EVAL LOW COMPLEX 20 MIN: CPT

## 2023-09-05 PROCEDURE — A9270 NON-COVERED ITEM OR SERVICE: HCPCS | Performed by: INTERNAL MEDICINE

## 2023-09-05 PROCEDURE — 700102 HCHG RX REV CODE 250 W/ 637 OVERRIDE(OP): Performed by: INTERNAL MEDICINE

## 2023-09-05 PROCEDURE — 700102 HCHG RX REV CODE 250 W/ 637 OVERRIDE(OP): Performed by: NURSE PRACTITIONER

## 2023-09-05 PROCEDURE — 97535 SELF CARE MNGMENT TRAINING: CPT

## 2023-09-05 PROCEDURE — 770020 HCHG ROOM/CARE - TELE (206)

## 2023-09-05 RX ORDER — METOPROLOL SUCCINATE 100 MG/1
50 TABLET, EXTENDED RELEASE ORAL 2 TIMES DAILY
Qty: 30 TABLET | Refills: 0 | Status: SHIPPED | OUTPATIENT
Start: 2023-09-05 | End: 2023-09-06

## 2023-09-05 RX ORDER — LOSARTAN POTASSIUM 50 MG/1
50 TABLET ORAL DAILY
Qty: 30 TABLET | Refills: 0 | Status: SHIPPED | OUTPATIENT
Start: 2023-09-06

## 2023-09-05 RX ORDER — ATORVASTATIN CALCIUM 80 MG/1
80 TABLET, FILM COATED ORAL EVERY EVENING
Qty: 30 TABLET | Refills: 0 | Status: SHIPPED | OUTPATIENT
Start: 2023-09-05

## 2023-09-05 RX ORDER — ASPIRIN 81 MG/1
81 TABLET ORAL DAILY
Qty: 100 TABLET | Refills: 0 | Status: SHIPPED | OUTPATIENT
Start: 2023-09-06

## 2023-09-05 RX ORDER — METOPROLOL SUCCINATE 50 MG/1
50 TABLET, EXTENDED RELEASE ORAL
Status: DISCONTINUED | OUTPATIENT
Start: 2023-09-06 | End: 2023-09-06 | Stop reason: HOSPADM

## 2023-09-05 RX ORDER — DAPAGLIFLOZIN 10 MG/1
10 TABLET, FILM COATED ORAL DAILY
Qty: 30 TABLET | Refills: 0 | Status: SHIPPED | OUTPATIENT
Start: 2023-09-06

## 2023-09-05 RX ORDER — METOPROLOL SUCCINATE 50 MG/1
50 TABLET, EXTENDED RELEASE ORAL 2 TIMES DAILY
Status: DISCONTINUED | OUTPATIENT
Start: 2023-09-05 | End: 2023-09-05

## 2023-09-05 RX ORDER — CLOPIDOGREL BISULFATE 75 MG/1
75 TABLET ORAL DAILY
Qty: 30 TABLET | Refills: 0 | Status: SHIPPED | OUTPATIENT
Start: 2023-09-06

## 2023-09-05 RX ORDER — SPIRONOLACTONE 25 MG/1
25 TABLET ORAL DAILY
Qty: 30 TABLET | Refills: 3 | Status: SHIPPED | OUTPATIENT
Start: 2023-09-06

## 2023-09-05 RX ADMIN — LEVOTHYROXINE SODIUM 75 MCG: 0.07 TABLET ORAL at 04:43

## 2023-09-05 RX ADMIN — DAPAGLIFLOZIN 10 MG: 10 TABLET, FILM COATED ORAL at 04:43

## 2023-09-05 RX ADMIN — ASPIRIN 81 MG: 81 TABLET, COATED ORAL at 04:43

## 2023-09-05 RX ADMIN — METOPROLOL SUCCINATE 100 MG: 50 TABLET, EXTENDED RELEASE ORAL at 04:43

## 2023-09-05 RX ADMIN — SPIRONOLACTONE 25 MG: 25 TABLET ORAL at 04:43

## 2023-09-05 RX ADMIN — LOSARTAN POTASSIUM 50 MG: 50 TABLET, FILM COATED ORAL at 04:43

## 2023-09-05 RX ADMIN — ATORVASTATIN CALCIUM 80 MG: 80 TABLET, FILM COATED ORAL at 17:13

## 2023-09-05 RX ADMIN — CLOPIDOGREL BISULFATE 75 MG: 75 TABLET ORAL at 04:43

## 2023-09-05 ASSESSMENT — COGNITIVE AND FUNCTIONAL STATUS - GENERAL
MOBILITY SCORE: 24
SUGGESTED CMS G CODE MODIFIER MOBILITY: CH

## 2023-09-05 ASSESSMENT — ENCOUNTER SYMPTOMS
ORTHOPNEA: 0
FEVER: 0
PALPITATIONS: 0
SHORTNESS OF BREATH: 0

## 2023-09-05 ASSESSMENT — PAIN DESCRIPTION - PAIN TYPE
TYPE: ACUTE PAIN

## 2023-09-05 ASSESSMENT — FIBROSIS 4 INDEX
FIB4 SCORE: 2.68
FIB4 SCORE: 2.68

## 2023-09-05 ASSESSMENT — GAIT ASSESSMENTS
DISTANCE (FEET): 75
GAIT LEVEL OF ASSIST: SUPERVISED

## 2023-09-05 NOTE — PROGRESS NOTES
Hospital Medicine Daily Progress Note    Date of Service  9/4/2023    Chief Complaint  Kristie Duarte is a 72 y.o. female admitted 9/2/2023 with chest pain    Hospital Course  72 y.o. female who presented 9/2/2023 with history of hypertension presented to the local emergency department for evaluation of chest pain and palpitations was noted to have STEMI she received TNK therapy started on heparin aspirin and transferred to our facility for higher level of care.  She was evaluated by cardiology and taken f to the Cath Lab and underwent successful PCI of the proximal through mid LAD with 2 JORDON stents.  EF was 30% during angiogram.  She was admitted to the ICU and started on medical therapy.    Interval Problem Update    Patient with PSVT required IV metoprolol  She denies chest pain this morning  Poor appetite discussed dietary supplements  I reviewed BMP and CBC  I reconsulted cardiology and discussed with  metoprolol increased to 100 twice daily    I have discussed this patient's plan of care and discharge plan at IDT rounds today with Case Management, Nursing, Nursing leadership, and other members of the IDT team.    Consultants/Specialty  cardiology    Code Status  Full Code    Disposition  The patient is not medically cleared for discharge to home or a post-acute facility.  Anticipate discharge to: home with close outpatient follow-up    I have placed the appropriate orders for post-discharge needs.    Review of Systems  Review of Systems   Constitutional:  Negative for fever.   Respiratory:  Negative for shortness of breath.    Cardiovascular:  Negative for chest pain.   All other systems reviewed and are negative.       Physical Exam  Temp:  [36.4 °C (97.5 °F)-36.6 °C (97.9 °F)] 36.5 °C (97.7 °F)  Pulse:  [] 93  Resp:  [18] 18  BP: ()/(54-82) 89/54  SpO2:  [94 %-99 %] 94 %    Physical Exam  Vitals and nursing note reviewed.   Constitutional:       General: She is not in acute  distress.  HENT:      Head: Normocephalic and atraumatic.      Nose: Nose normal. No rhinorrhea.      Mouth/Throat:      Pharynx: No oropharyngeal exudate or posterior oropharyngeal erythema.   Eyes:      General: No scleral icterus.        Right eye: No discharge.         Left eye: No discharge.   Cardiovascular:      Rate and Rhythm: Normal rate and regular rhythm.      Heart sounds: Normal heart sounds. No murmur heard.     No friction rub. No gallop.   Pulmonary:      Effort: Pulmonary effort is normal. No respiratory distress.      Breath sounds: Normal breath sounds. No stridor. No wheezing, rhonchi or rales.   Chest:      Chest wall: No tenderness.   Abdominal:      General: Bowel sounds are normal. There is no distension.      Palpations: Abdomen is soft. There is no mass.      Tenderness: There is no abdominal tenderness. There is no rebound.   Musculoskeletal:         General: No swelling or tenderness.      Cervical back: Neck supple. No rigidity.   Skin:     General: Skin is warm and dry.      Coloration: Skin is not cyanotic or jaundiced.      Nails: There is no clubbing.   Neurological:      General: No focal deficit present.      Mental Status: She is alert and oriented to person, place, and time.      Cranial Nerves: No cranial nerve deficit.      Motor: No weakness.   Psychiatric:         Mood and Affect: Mood normal.         Behavior: Behavior normal.         Fluids    Intake/Output Summary (Last 24 hours) at 9/4/2023 1702  Last data filed at 9/4/2023 1057  Gross per 24 hour   Intake 720 ml   Output 0 ml   Net 720 ml       Laboratory  Recent Labs     09/02/23  0607 09/03/23 0416 09/04/23  0355   WBC 18.2* 15.3* 14.5*   RBC 4.95 4.61 4.46   HEMOGLOBIN 15.4 14.3 14.0   HEMATOCRIT 45.6 43.5 41.2   MCV 92.1 94.4 92.4   MCH 31.1 31.0 31.4   MCHC 33.8 32.9 34.0   RDW 42.8 45.1 43.4   PLATELETCT 312 298 277   MPV 10.7 10.3 10.9     Recent Labs     09/02/23  0607 09/03/23 0416 09/04/23  0355   SODIUM 140  137 134*   POTASSIUM 4.0 4.7 4.1   CHLORIDE 102 101 100   CO2 25 24 22   GLUCOSE 189* 142* 123*   BUN 20 22 21   CREATININE 0.80 0.66 0.66   CALCIUM 9.5 9.4 8.6     Recent Labs     09/02/23  0607   APTT 84.0*   INR 1.04         Recent Labs     09/03/23  0416   TRIGLYCERIDE 65   HDL 59   LDL 79       Imaging  DX-CHEST-PORTABLE (1 VIEW)   Final Result         1. No acute cardiopulmonary abnormalities are identified.      EC-ECHOCARDIOGRAM COMPLETE W/ CONT   Final Result      DX-CHEST-PORTABLE (1 VIEW)   Final Result         1.  No acute cardiopulmonary disease.      CL-LEFT HEART CATHETERIZATION WITH POSSIBLE INTERVENTION    (Results Pending)        Assessment/Plan  * STEMI (ST elevation myocardial infarction) (H[facility])- (present on admission)  Assessment & Plan  Status post TNK and transferred to our facility where she underwent urgent cardiac catheterization with PCI to proximal through mid LAD with 2 JORDON stents  Continue current medical therapy with aspirin Plavix losartan   spironolactone farxiga   Metoprolol increased to 100 twice daily  Discussed with cardiology    PSVT (paroxysmal supraventricular tachycardia) ([facility])  Assessment & Plan  Discussed with cardiology metoprolol increased  Continue telemetry monitoring if remains clinically stable plan to discharge in a.m.    Ischemic cardiomyopathy  Assessment & Plan  Echocardiogram reviewed EF 30%  Continue  losartan spironolactone  farxiga added  Switch to Toprol- twice daily    Discussed need to follow-up with cardiology after she returns home in [facility]    Hypothyroidism due to Hashimoto's thyroiditis- (present on admission)  Assessment & Plan  Continue levothyroxine  TSH normal    Hyperlipidemia  Assessment & Plan  Continue atorvastatin         VTE prophylaxis:    enoxaparin ppx      I have performed a physical exam and reviewed and updated ROS and Plan today (9/4/2023). In review of yesterday's note (9/3/2023), there are no changes except as  documented above.

## 2023-09-05 NOTE — DISCHARGE PLANNING
ROLAND met with pt and pt's  at bedside. Per pt's , he will be here for pt at d/c. They will be staying with a friend in town for tonight and head to [place] tomorrow.

## 2023-09-05 NOTE — PROGRESS NOTES
Monitor Summary:    SR-ST , up to 150's  (R)PVC, (F)PAC  With BBB    .13/.13/.40

## 2023-09-05 NOTE — CARE PLAN
Problem: Knowledge Deficit - Standard  Goal: Patient and family/care givers will demonstrate understanding of plan of care, disease process/condition, diagnostic tests and medications  Description: Target End Date:  1-3 days or as soon as patient condition allows    Document in Patient Education    1.  Patient and family/caregiver oriented to unit, equipment, visitation policy and means for communicating concern  2.  Complete/review Learning Assessment  3.  Assess knowledge level of disease process/condition, treatment plan, diagnostic tests and medications  4.  Explain disease process/condition, treatment plan, diagnostic tests and medications  Outcome: Progressing     Problem: Psychosocial  Goal: Patient's level of anxiety will decrease  Description: Target End Date:  1-3 days or as soon as patient condition allows    1.  Collaborate with patient and family/caregiver to identify triggers and develop strategies to cope with anxiety  2.  Implement stimuli reduction, calming techniques  3.  Pharmacologic management per provider order  4.  Encourage patient/family/care giver participation  5.  Collaborate with interdisciplinary team including Psychologist or Behavioral Health Team as needed  Outcome: Progressing   The patient is Watcher - Medium risk of patient condition declining or worsening    Shift Goals  Clinical Goals: monitor HR  Patient Goals: go home  Family Goals: rest    Progress made toward(s) clinical / shift goals:  maintain hemodynamic stability, manage pain, monitor HR    Patient is not progressing towards the following goals:

## 2023-09-05 NOTE — ASSESSMENT & PLAN NOTE
Discussed with cardiology metoprolol increased  Continue telemetry monitoring if remains clinically stable plan to discharge in a.m.

## 2023-09-05 NOTE — THERAPY
"Physical Therapy   Initial Evaluation     Patient Name: Kristie Duarte  Age:  72 y.o., Sex:  female  Medical Record #: [id number]  Today's Date: 2023     Precautions  Precautions: Cardiac Precautions (See Comments)  Comments: EF=30%, low BP    Assessment  Patient is 72 y.o. female with a diagnosis of stemi, s/p stent placement and meds adjust, now with low BP. BP: supine:79/52, sittin/54, standing 1 min: 68/41, standing 3 min: 70/41. Pt reprots feeling \"a little off\" when up to walk. Pt is seen today for phase I cardiac rehab education, with topics covered including return to her walking program gradually using RPE scale, talk test, and gentle progression of distance with attention to symptoms. Phase II cardiac rehab was introduced and encouraged. .  Additional factors influencing patient status / progress : Pt is limited today by low BP, nsg updated. No further inpt PT needs. .      Plan    DC Equipment Recommendations: None  Discharge Recommendations: Other - (phase II cardiac rehab)            Objective       23 0900   Charge Group   PT Evaluation PT Evaluation Low   PT Self Care / Home Evaluation (Units) 1   Total Time Spent   PT Total Time Yes   PT Evaluation Time Spent (Mins) 15   PT Self Care/Home Evaluation Time Spent (Mins) 15   PT Total Time Spent (Calculated) 30   Initial Contact Note    Initial Contact Note Order Received and Verified, Evaluation Only - Patient Does Not Require Further Acute Physical Therapy at this Time.  However, May Benefit from Post Acute Therapy for Higher Level Functional Deficits.   Precautions   Precautions Cardiac Precautions (See Comments)   Comments EF=30%, low BP   Vitals   Patient BP Position Standing 1 minute   Blood Pressure  (!) 68/41   Pain 0 - 10 Group   Therapist Pain Assessment 0;During Activity;Nurse Notified   Prior Living Situation   Prior Services None   Housing / Facility 1 Story House   Steps Into Home 2   Steps In Home 0   Lives with - Patient's Self " "Care Capacity Spouse;Adult Children   Comments help as needed at home   Prior Level of Functional Mobility   Bed Mobility Independent   Transfer Status Independent   Ambulation Independent   Ambulation Distance community, walks 2 miles a day   Assistive Devices Used None   Stairs Independent   Cognition    Level of Consciousness Alert   Active ROM Lower Body    Active ROM Lower Body  WDL   Strength Lower Body   Lower Body Strength  WDL   Balance Assessment   Sitting Balance (Static) Good   Sitting Balance (Dynamic) Good   Standing Balance (Static) Good   Standing Balance (Dynamic) Good   Weight Shift Sitting Good   Weight Shift Standing Good   Comments with no AD   Bed Mobility    Supine to Sit Supervised   Sit to Supine Supervised   Scooting Supervised   Rolling Supervised   Gait Analysis   Gait Level Of Assist Supervised   Assistive Device None   Distance (Feet) 75  (with one seated rest)   # of Times Distance was Traveled 1   Comments BP low, pt reports feeling \"a little off\"   Functional Mobility   Sit to Stand Supervised   Bed, Chair, Wheelchair Transfer Supervised   Transfer Method Stand Pivot   How much difficulty does the patient currently have...   Turning over in bed (including adjusting bedclothes, sheets and blankets)? 4   Sitting down on and standing up from a chair with arms (e.g., wheelchair, bedside commode, etc.) 4   Moving from lying on back to sitting on the side of the bed? 4   How much help from another person does the patient currently need...   Moving to and from a bed to a chair (including a wheelchair)? 4   Need to walk in a hospital room? 4   Climbing 3-5 steps with a railing? 4   6 clicks Mobility Score 24   Activity Tolerance   Standing 5   Education Group   Education Provided Cardiac Precautions   Cardiac Precautions Patient Response Patient;Family;Acceptance;Explanation;Handout;Verbal Demonstration;Action Demonstration   Additional Comments phase I cardiac rehab   Anticipated Discharge " Equipment and Recommendations   DC Equipment Recommendations None   Discharge Recommendations Other -  (phase II cardiac rehab)   Interdisciplinary Plan of Care Collaboration   IDT Collaboration with  Nursing   Patient Position at End of Therapy In Bed;Call Light within Reach;Tray Table within Reach;Phone within Reach;Family / Friend in Room   Collaboration Comments nsg updated   Session Information   Date / Session Number  9/5-1x only          Patient requests all Lab, Cardiology, and Radiology Results on their Discharge Instructions

## 2023-09-05 NOTE — PROGRESS NOTES
Hospital Medicine Daily Progress Note    Date of Service  9/5/2023    Chief Complaint  Kristie Duarte is a 72 y.o. female admitted 9/2/2023 with chest pain    Hospital Course  72 y.o. female who presented 9/2/2023 with history of hypertension presented to the local emergency department for evaluation of chest pain and palpitations was noted to have STEMI she received TNK therapy started on heparin aspirin and transferred to our facility for higher level of care.  She was evaluated by cardiology and taken f to the Cath Lab and underwent successful PCI of the proximal through mid LAD with 2 JORDON stents.  EF was 30% during angiogram.  She was admitted to the ICU and started on medical therapy.    Interval Problem Update    Patient seen and examined, resting in bed, her metoprolol dose was increased yesterday and today her BP is low with sbp 75. I have decreased metoprolol dose today will monitor on tele and recheck BP today to make sure not dropping even further  Appreciate cardiology rec. On the case     I have discussed this patient's plan of care and discharge plan at IDT rounds today with Case Management, Nursing, Nursing leadership, and other members of the IDT team.    Consultants/Specialty  cardiology    Code Status  Full Code    Disposition  The patient is not medically cleared for discharge to home or a post-acute facility.      I have placed the appropriate orders for post-discharge needs.    Review of Systems  Review of Systems   Constitutional:  Negative for fever.   Respiratory:  Negative for shortness of breath.    Cardiovascular:  Negative for chest pain, palpitations and orthopnea.   All other systems reviewed and are negative.       Physical Exam  Temp:  [36.4 °C (97.5 °F)-36.8 °C (98.2 °F)] 36.4 °C (97.5 °F)  Pulse:  [76-86] 79  Resp:  [16-18] 16  BP: ()/(41-66) 83/51  SpO2:  [94 %-96 %] 95 %    Physical Exam  Vitals and nursing note reviewed.   Constitutional:       General: She is not in acute  distress.  HENT:      Head: Normocephalic and atraumatic.      Nose: Nose normal. No rhinorrhea.      Mouth/Throat:      Pharynx: No oropharyngeal exudate or posterior oropharyngeal erythema.   Eyes:      General: No scleral icterus.        Right eye: No discharge.         Left eye: No discharge.   Cardiovascular:      Rate and Rhythm: Normal rate and regular rhythm.      Heart sounds: Normal heart sounds. No murmur heard.     No friction rub. No gallop.   Pulmonary:      Effort: Pulmonary effort is normal. No respiratory distress.      Breath sounds: Normal breath sounds. No stridor. No wheezing, rhonchi or rales.   Chest:      Chest wall: No tenderness.   Abdominal:      General: Bowel sounds are normal. There is no distension.      Palpations: Abdomen is soft. There is no mass.      Tenderness: There is no abdominal tenderness. There is no rebound.   Musculoskeletal:         General: No swelling or tenderness.      Cervical back: Neck supple. No rigidity.   Skin:     General: Skin is warm and dry.      Coloration: Skin is not cyanotic or jaundiced.      Nails: There is no clubbing.   Neurological:      General: No focal deficit present.      Mental Status: She is alert and oriented to person, place, and time.      Cranial Nerves: No cranial nerve deficit.      Motor: No weakness.   Psychiatric:         Mood and Affect: Mood normal.         Behavior: Behavior normal.         Fluids    Intake/Output Summary (Last 24 hours) at 9/5/2023 1516  Last data filed at 9/4/2023 2130  Gross per 24 hour   Intake 400 ml   Output --   Net 400 ml       Laboratory  Recent Labs     09/03/23 0416 09/04/23  0355   WBC 15.3* 14.5*   RBC 4.61 4.46   HEMOGLOBIN 14.3 14.0   HEMATOCRIT 43.5 41.2   MCV 94.4 92.4   MCH 31.0 31.4   MCHC 32.9 34.0   RDW 45.1 43.4   PLATELETCT 298 277   MPV 10.3 10.9     Recent Labs     09/03/23 0416 09/04/23  0355   SODIUM 137 134*   POTASSIUM 4.7 4.1   CHLORIDE 101 100   CO2 24 22   GLUCOSE 142* 123*   BUN  22 21   CREATININE 0.66 0.66   CALCIUM 9.4 8.6               Recent Labs     09/03/23  0416   TRIGLYCERIDE 65   HDL 59   LDL 79       Imaging  DX-CHEST-PORTABLE (1 VIEW)   Final Result         1. No acute cardiopulmonary abnormalities are identified.      EC-ECHOCARDIOGRAM COMPLETE W/ CONT   Final Result      DX-CHEST-PORTABLE (1 VIEW)   Final Result         1.  No acute cardiopulmonary disease.      CL-LEFT HEART CATHETERIZATION WITH POSSIBLE INTERVENTION    (Results Pending)        Assessment/Plan  * STEMI (ST elevation myocardial infarction) ([facility])- (present on admission)  Assessment & Plan  Status post TNK and transferred to our facility where she underwent urgent cardiac catheterization with PCI to proximal through mid LAD with 2 JORDON stents  Continue current medical therapy with aspirin Plavix losartan   spironolactone farxiga   Metoprolol increased to 100 twice daily  Discussed with cardiology    PSVT (paroxysmal supraventricular tachycardia) ([facility])  Assessment & Plan  Discussed with cardiology metoprolol increased  Continue telemetry monitoring if remains clinically stable plan to discharge in a.m.    Ischemic cardiomyopathy  Assessment & Plan  Echocardiogram reviewed EF 30%  Continue  losartan spironolactone  farxiga added  Switch to Toprol- twice daily    Discussed need to follow-up with cardiology after she returns home in [facility]    Hypothyroidism due to Hashimoto's thyroiditis- (present on admission)  Assessment & Plan  Continue levothyroxine  TSH normal    Hyperlipidemia  Assessment & Plan  Continue atorvastatin         VTE prophylaxis:    enoxaparin ppx    Greater than 51 minutes spent prepping to see patient (e.g. review of tests) obtaining and/or reviewing separately obtained history. Performing a medically appropriate examination and/ evaluation.  Counseling and educating the patient/family/caregiver.  Ordering medications, tests, or procedures.  Referring and communicating with  other health care professionals.  Documenting clinical information in EPIC.  Independently interpreting results and communicating results to patient/family/caregiver.  Care coordination.      I have performed a physical exam and reviewed and updated ROS and Plan today (9/5/2023). In review of yesterday's note (9/4/2023), there are no changes except as documented above.

## 2023-09-05 NOTE — DOCUMENTATION QUERY
"                                                                         [facility]                                                                       Query Response Note      PATIENT:               KIRK AN  ACCT #:                  [id number]  MRN:                     [id number]  :                      1950  ADMIT DATE:       2023 5:55 AM  DISCH DATE:          RESPONDING  PROVIDER #:        [id number]           QUERY TEXT:    Documentation in the medical record indicates that this patient has been diagnosed as having systolic heart failure.     Can the acuity of the heart failure be further specified based on the clinical indicators and required treatments?    The patient's Clinical Indicators include:  71yo with dx of STEMI, CAD, paroxysmal SVT, systolic heart failure     Danaf PN \"HFrEF, Stage C, Class I, LVEF 30%: -Heart failure due to ischemia, recent STEMI.\"     BNP 2666    Risk factors: STEMI, HTN, advanced age  Treatments: Echocardiogram, medication, labwork, imaging, monitoring    Contact me with questions.     Thank you,  Ally Reaves, NNEKAP, CDI  [email or web address]  Options provided:   -- Acute   -- Exacerbation or decompensation   -- Acute on Chronic   -- Other explanation, Please specify   -- Unable to determine      Query created by: Ally Reaves on 2023 6:50 AM    RESPONSE TEXT:    Acute          Electronically signed by:  JOSE M NUNEZ MD 2023 7:46 AM              "

## 2023-09-05 NOTE — CARE PLAN
The patient is Watcher - Medium risk of patient condition declining or worsening    Shift Goals  Clinical Goals: stable VS  Patient Goals: go home  Family Goals: rest    Progress made toward(s) clinical / shift goals:  Patients BP has been low through the day, keeping patient another night and decreasing medication dosage    Problem: Knowledge Deficit - Standard  Goal: Patient and family/care givers will demonstrate understanding of plan of care, disease process/condition, diagnostic tests and medications  Outcome: Progressing     Problem: Pain - Standard  Goal: Alleviation of pain or a reduction in pain to the patient’s comfort goal  Outcome: Progressing     Problem: Psychosocial  Goal: Patient's ability to verbalize feelings about condition will improve  Outcome: Progressing     Problem: Fluid Volume  Goal: Fluid volume balance will be maintained  Outcome: Progressing       Patient is not progressing towards the following goals:

## 2023-09-06 ENCOUNTER — PHARMACY VISIT (OUTPATIENT)
Dept: PHARMACY | Facility: MEDICAL CENTER | Age: 73
End: 2023-09-06
Payer: COMMERCIAL

## 2023-09-06 VITALS
WEIGHT: 118.83 LBS | BODY MASS INDEX: 21.05 KG/M2 | HEART RATE: 82 BPM | RESPIRATION RATE: 17 BRPM | DIASTOLIC BLOOD PRESSURE: 54 MMHG | TEMPERATURE: 97.9 F | HEIGHT: 63 IN | SYSTOLIC BLOOD PRESSURE: 97 MMHG | OXYGEN SATURATION: 97 %

## 2023-09-06 PROCEDURE — 700102 HCHG RX REV CODE 250 W/ 637 OVERRIDE(OP): Performed by: NURSE PRACTITIONER

## 2023-09-06 PROCEDURE — A9270 NON-COVERED ITEM OR SERVICE: HCPCS | Performed by: INTERNAL MEDICINE

## 2023-09-06 PROCEDURE — 700102 HCHG RX REV CODE 250 W/ 637 OVERRIDE(OP): Performed by: INTERNAL MEDICINE

## 2023-09-06 PROCEDURE — A9270 NON-COVERED ITEM OR SERVICE: HCPCS | Performed by: NURSE PRACTITIONER

## 2023-09-06 PROCEDURE — 99239 HOSP IP/OBS DSCHRG MGMT >30: CPT | Performed by: INTERNAL MEDICINE

## 2023-09-06 RX ORDER — METOPROLOL SUCCINATE 50 MG/1
50 TABLET, EXTENDED RELEASE ORAL DAILY
Qty: 30 TABLET | Refills: 0 | Status: SHIPPED | OUTPATIENT
Start: 2023-09-07

## 2023-09-06 RX ADMIN — LEVOTHYROXINE SODIUM 75 MCG: 0.07 TABLET ORAL at 05:36

## 2023-09-06 RX ADMIN — SPIRONOLACTONE 25 MG: 25 TABLET ORAL at 05:35

## 2023-09-06 RX ADMIN — SENNOSIDES AND DOCUSATE SODIUM 2 TABLET: 50; 8.6 TABLET ORAL at 05:36

## 2023-09-06 RX ADMIN — LOSARTAN POTASSIUM 50 MG: 50 TABLET, FILM COATED ORAL at 05:36

## 2023-09-06 RX ADMIN — DAPAGLIFLOZIN 10 MG: 10 TABLET, FILM COATED ORAL at 05:36

## 2023-09-06 RX ADMIN — METOPROLOL SUCCINATE 50 MG: 50 TABLET, EXTENDED RELEASE ORAL at 05:35

## 2023-09-06 RX ADMIN — CLOPIDOGREL BISULFATE 75 MG: 75 TABLET ORAL at 05:36

## 2023-09-06 RX ADMIN — ASPIRIN 81 MG: 81 TABLET, COATED ORAL at 05:35

## 2023-09-06 NOTE — PROGRESS NOTES
Handoff report received from day shift nurse and pt care assumed. Pt is currently resting in bed, A&Ox4 with no reports of pain, on RA, and VSS. Tele box on, rate and rhythm verified. Call light within reach, bed in lowest and locked position, fall precautions in place. Pt educated on use of call light and POC discussed. Hourly rounding in place.

## 2023-09-06 NOTE — PROGRESS NOTES
Received report from NOC shift RN. Patient is A&Ox4, VSS, no signs of distress. All questions and concerns answered, call light in reach, will continue to monitor.

## 2023-09-06 NOTE — DISCHARGE INSTRUCTIONS
Radial Catherization Discharge Instructions      Do not subject hand/arm to any forceful movements for 24 hours    i.e. supporting weight when rising from the chair or bed.   Do not drive a car for 24 hours  You may remove the dressing tomorrow  You may shower on the day following your procedure.  Do not take a tub bath or submerge the puncture site in water for 3 days following the procedure.  No Lifting more than 3-5 pounds with affected wrist for 5 days  Follow up with   Increase fluids for 2 days post procedure.  Continue all previous medications unless otherwise instructed.    If bleeding should occur following discharge:  Sit down and apply firm pressure to site with your fingers for 10 minutes  If the bleeding stops, continue to sit quietly, keeping your wrist straight for 2 hours.  Notify physician as soon as possible ( [phone])  If bleeding does not stop after 10 minutes, or if there is a large amount of bleeding or spurting, call 911 immediately.  Do not drive yourself to the hospital.   Mohs Method Verbiage: An incision at a 45 degree angle following the standard Mohs approach was done and the specimen was harvested as a microscopic controlled layer.

## 2023-09-06 NOTE — DISCHARGE SUMMARY
Discharge Summary    CHIEF COMPLAINT ON ADMISSION  No chief complaint on file.      Reason for Admission  STEMI TRANSFER     Admission Date  9/2/2023    CODE STATUS  Prior    HPI & HOSPITAL COURSE  This is a 72 y.o. female  who presented 9/2/2023 with history of hypertension presented to the local emergency department for evaluation of chest pain and palpitations was noted to have STEMI she received TNK therapy started on heparin aspirin and transferred to our facility for higher level of care.  She was evaluated by cardiology and taken to the Cath Lab and underwent successful PCI of the proximal through mid LAD with 2 JORDON stents.  EF was 30% during angiogram.  She was admitted to the ICU and started on medical therapy. She was started on DAPT. She had some SVT so her metoprolol dose was increased but her BP dropped so it was dose was decreased. No jarvis SVT   Patient now doing better and her BP is better controlled.  Patient lives in [place]. She will be discharged home today and will need to follow up with cardiology in [place]     The patient met 2-midnight criteria for an inpatient stay at the time of discharge.    Discharge Date  9/6/2023    FOLLOW UP ITEMS POST DISCHARGE  Cardiology     DISCHARGE DIAGNOSES  Principal Problem:    STEMI (ST elevation myocardial infarction) (HCC) (POA: Yes)  Active Problems:    Hyperlipidemia (POA: Unknown)    Hypothyroidism due to Hashimoto's thyroiditis (POA: Yes)    Ischemic cardiomyopathy (POA: Unknown)    PSVT (paroxysmal supraventricular tachycardia) (HCC) (POA: Unknown)  Resolved Problems:    * No resolved hospital problems. *      FOLLOW UP  No future appointments.  Primary Care    Follow up  Please call your primary care provider to schedule a hospital follow up. Thank you.      MEDICATIONS ON DISCHARGE     Medication List        START taking these medications        Instructions   Aspirin Low Dose 81 MG EC tablet  Generic drug: aspirin   Take 1 Tablet by mouth every  day.  Dose: 81 mg     atorvastatin 80 MG tablet  Commonly known as: Lipitor   Take 1 Tablet by mouth every evening.  Dose: 80 mg     clopidogrel 75 MG Tabs  Commonly known as: Plavix   Take 1 Tablet by mouth every day.  Dose: 75 mg     Farxiga 10 MG Tabs  Generic drug: dapagliflozin propanediol   Take 1 Tablet by mouth every day.  Dose: 10 mg     losartan 50 MG Tabs  Commonly known as: Cozaar   Take 1 Tablet by mouth every day.  Dose: 50 mg     spironolactone 25 MG Tabs  Commonly known as: Aldactone   Take 1 Tablet by mouth every day.  Dose: 25 mg            CHANGE how you take these medications        Instructions   metoprolol SR 50 MG Tb24  Start taking on: September 7, 2023  What changed: when to take this  Commonly known as: Toprol XL   Take 1 Tablet by mouth every day.  Dose: 50 mg            CONTINUE taking these medications        Instructions   alendronate 70 MG Tabs  Commonly known as: Fosamax   Take 70 mg by mouth every 7 days.  Dose: 70 mg     BENEFIBER PO   Take 1 Scoop by mouth 2 times a day. Dissolve in liquid  Dose: 1 Scoop     levothyroxine 75 MCG Tabs  Commonly known as: Synthroid   Take 75 mcg by mouth every morning on an empty stomach.  Dose: 75 mcg     multivitamin Tabs   Take 1 Tablet by mouth every day.  Dose: 1 Tablet     MEGAN-C PO   Take 1 Tablet by mouth every day.  Dose: 1 Tablet              Allergies  No Known Allergies    DIET  No orders of the defined types were placed in this encounter.      ACTIVITY  As tolerated.  Weight bearing as tolerated    CONSULTATIONS  Cardiology     PROCEDURES  successful PCI of the proximal through mid LAD with 2 JORDON stents    LABORATORY  Lab Results   Component Value Date    SODIUM 134 (L) 09/04/2023    POTASSIUM 4.1 09/04/2023    CHLORIDE 100 09/04/2023    CO2 22 09/04/2023    GLUCOSE 123 (H) 09/04/2023    BUN 21 09/04/2023    CREATININE 0.66 09/04/2023        Lab Results   Component Value Date    WBC 14.5 (H) 09/04/2023    HEMOGLOBIN 14.0 09/04/2023     HEMATOCRIT 41.2 09/04/2023    PLATELETCT 277 09/04/2023        Total time of the discharge process exceeds 35 minutes.